# Patient Record
Sex: FEMALE | Race: WHITE | Employment: OTHER | URBAN - METROPOLITAN AREA
[De-identification: names, ages, dates, MRNs, and addresses within clinical notes are randomized per-mention and may not be internally consistent; named-entity substitution may affect disease eponyms.]

---

## 2017-06-27 ENCOUNTER — HOSPITAL ENCOUNTER (OUTPATIENT)
Dept: PREADMISSION TESTING | Age: 72
Discharge: HOME | End: 2017-06-27
Attending: ORTHOPAEDIC SURGERY | Admitting: ORTHOPAEDIC SURGERY

## 2017-06-27 VITALS
HEIGHT: 60 IN | OXYGEN SATURATION: 100 % | TEMPERATURE: 97 F | SYSTOLIC BLOOD PRESSURE: 127 MMHG | HEART RATE: 71 BPM | DIASTOLIC BLOOD PRESSURE: 85 MMHG | WEIGHT: 136 LBS | RESPIRATION RATE: 16 BRPM | BODY MASS INDEX: 26.7 KG/M2

## 2017-06-27 LAB
A/G RATIO: 1.4 (ref 1.1–2.2)
ABO/RH: NORMAL
ALBUMIN SERPL-MCNC: 4 G/DL (ref 3.4–5)
ALP BLD-CCNC: 147 U/L (ref 40–129)
ALT SERPL-CCNC: 19 U/L (ref 10–40)
ANION GAP SERPL CALCULATED.3IONS-SCNC: 9 MMOL/L (ref 3–16)
ANTIBODY SCREEN: NORMAL
APTT: 28.9 SEC (ref 21–31.8)
AST SERPL-CCNC: 39 U/L (ref 15–37)
BASOPHILS ABSOLUTE: 0 K/UL (ref 0–0.2)
BASOPHILS RELATIVE PERCENT: 0.7 %
BILIRUB SERPL-MCNC: 0.3 MG/DL (ref 0–1)
BUN BLDV-MCNC: 15 MG/DL (ref 7–20)
CALCIUM SERPL-MCNC: 9.8 MG/DL (ref 8.3–10.6)
CHLORIDE BLD-SCNC: 104 MMOL/L (ref 99–110)
CO2: 29 MMOL/L (ref 21–32)
CREAT SERPL-MCNC: 0.7 MG/DL (ref 0.6–1.2)
EKG ATRIAL RATE: 72 BPM
EKG DIAGNOSIS: NORMAL
EKG P AXIS: 66 DEGREES
EKG P-R INTERVAL: 178 MS
EKG Q-T INTERVAL: 406 MS
EKG QRS DURATION: 86 MS
EKG QTC CALCULATION (BAZETT): 444 MS
EKG R AXIS: 76 DEGREES
EKG T AXIS: 66 DEGREES
EKG VENTRICULAR RATE: 72 BPM
EOSINOPHILS ABSOLUTE: 0.1 K/UL (ref 0–0.6)
EOSINOPHILS RELATIVE PERCENT: 1.4 %
GFR AFRICAN AMERICAN: >60
GFR NON-AFRICAN AMERICAN: >60
GLOBULIN: 2.8 G/DL
GLUCOSE BLD-MCNC: 91 MG/DL (ref 70–99)
HCT VFR BLD CALC: 37.9 % (ref 36–48)
HEMOGLOBIN: 12.2 G/DL (ref 12–16)
INR BLD: 1.05 (ref 0.85–1.15)
LYMPHOCYTES ABSOLUTE: 1.2 K/UL (ref 1–5.1)
LYMPHOCYTES RELATIVE PERCENT: 24.6 %
MCH RBC QN AUTO: 30.6 PG (ref 26–34)
MCHC RBC AUTO-ENTMCNC: 32.1 G/DL (ref 31–36)
MCV RBC AUTO: 95.3 FL (ref 80–100)
MONOCYTES ABSOLUTE: 0.4 K/UL (ref 0–1.3)
MONOCYTES RELATIVE PERCENT: 9.4 %
NEUTROPHILS ABSOLUTE: 3.1 K/UL (ref 1.7–7.7)
NEUTROPHILS RELATIVE PERCENT: 63.9 %
PDW BLD-RTO: 13.9 % (ref 12.4–15.4)
PLATELET # BLD: 185 K/UL (ref 135–450)
PMV BLD AUTO: 8.8 FL (ref 5–10.5)
POTASSIUM SERPL-SCNC: 4.4 MMOL/L (ref 3.5–5.1)
PROTHROMBIN TIME: 11.9 SEC (ref 9.6–13)
RBC # BLD: 3.98 M/UL (ref 4–5.2)
SODIUM BLD-SCNC: 142 MMOL/L (ref 136–145)
TOTAL PROTEIN: 6.8 G/DL (ref 6.4–8.2)
WBC # BLD: 4.8 K/UL (ref 4–11)

## 2017-06-27 PROCEDURE — 93010 ELECTROCARDIOGRAM REPORT: CPT | Performed by: INTERNAL MEDICINE

## 2017-06-27 RX ORDER — CITALOPRAM 40 MG/1
40 TABLET ORAL DAILY
COMMUNITY

## 2017-06-27 RX ORDER — MELOXICAM 15 MG/1
15 TABLET ORAL DAILY
Status: ON HOLD | COMMUNITY
End: 2017-07-11 | Stop reason: HOSPADM

## 2017-06-27 RX ORDER — PANTOPRAZOLE SODIUM 40 MG/1
40 TABLET, DELAYED RELEASE ORAL DAILY
COMMUNITY

## 2017-06-27 RX ORDER — ZALEPLON 10 MG/1
10 CAPSULE ORAL NIGHTLY PRN
COMMUNITY
End: 2018-06-12

## 2017-06-27 RX ORDER — GABAPENTIN 300 MG/1
300 CAPSULE ORAL 3 TIMES DAILY
Status: ON HOLD | COMMUNITY
End: 2018-06-13 | Stop reason: HOSPADM

## 2017-06-27 RX ORDER — ROPINIROLE 1 MG/1
1 TABLET, FILM COATED ORAL NIGHTLY PRN
COMMUNITY
End: 2018-06-12

## 2017-06-27 RX ORDER — LISINOPRIL 20 MG/1
40 TABLET ORAL DAILY
COMMUNITY

## 2017-06-27 RX ORDER — DULOXETIN HYDROCHLORIDE 60 MG/1
60 CAPSULE, DELAYED RELEASE ORAL DAILY
Status: ON HOLD | COMMUNITY
End: 2018-06-13 | Stop reason: HOSPADM

## 2017-06-27 ASSESSMENT — PAIN DESCRIPTION - FREQUENCY: FREQUENCY: CONTINUOUS

## 2017-06-27 ASSESSMENT — PAIN DESCRIPTION - ORIENTATION: ORIENTATION: RIGHT;LEFT

## 2017-06-27 ASSESSMENT — PAIN DESCRIPTION - DESCRIPTORS: DESCRIPTORS: CONSTANT;SHARP;ACHING

## 2017-06-27 ASSESSMENT — PAIN SCALES - GENERAL: PAINLEVEL_OUTOF10: 8

## 2017-06-27 ASSESSMENT — PAIN DESCRIPTION - LOCATION: LOCATION: KNEE

## 2017-06-27 ASSESSMENT — PAIN DESCRIPTION - PAIN TYPE: TYPE: CHRONIC PAIN

## 2017-06-28 LAB — MRSA SCREEN RT-PCR: NORMAL

## 2017-07-12 PROBLEM — Z96.659 LOOSENING OF KNEE JOINT PROSTHESIS (HCC): Status: ACTIVE | Noted: 2017-07-12

## 2017-07-12 PROBLEM — T84.038A LOOSENING OF KNEE JOINT PROSTHESIS (HCC): Status: ACTIVE | Noted: 2017-07-12

## 2017-10-06 ENCOUNTER — HOSPITAL ENCOUNTER (OUTPATIENT)
Dept: PREADMISSION TESTING | Age: 72
Discharge: HOME | End: 2017-10-06
Admitting: ORTHOPAEDIC SURGERY

## 2017-10-06 VITALS
DIASTOLIC BLOOD PRESSURE: 85 MMHG | HEART RATE: 74 BPM | TEMPERATURE: 97 F | OXYGEN SATURATION: 100 % | SYSTOLIC BLOOD PRESSURE: 131 MMHG | RESPIRATION RATE: 16 BRPM | WEIGHT: 131 LBS | HEIGHT: 60 IN | BODY MASS INDEX: 25.72 KG/M2

## 2017-10-06 LAB
A/G RATIO: 2 (ref 1.1–2.2)
ABO/RH: NORMAL
ALBUMIN SERPL-MCNC: 4.2 G/DL (ref 3.4–5)
ALP BLD-CCNC: 158 U/L (ref 40–129)
ALT SERPL-CCNC: 18 U/L (ref 10–40)
ANION GAP SERPL CALCULATED.3IONS-SCNC: 10 MMOL/L (ref 3–16)
ANTIBODY SCREEN: NORMAL
APTT: 29.3 SEC (ref 24.1–34.9)
AST SERPL-CCNC: 29 U/L (ref 15–37)
BASOPHILS ABSOLUTE: 0 K/UL (ref 0–0.2)
BASOPHILS RELATIVE PERCENT: 0.8 %
BILIRUB SERPL-MCNC: <0.2 MG/DL (ref 0–1)
BUN BLDV-MCNC: 19 MG/DL (ref 7–20)
CALCIUM SERPL-MCNC: 9.3 MG/DL (ref 8.3–10.6)
CHLORIDE BLD-SCNC: 103 MMOL/L (ref 99–110)
CO2: 27 MMOL/L (ref 21–32)
CREAT SERPL-MCNC: 0.7 MG/DL (ref 0.6–1.2)
EOSINOPHILS ABSOLUTE: 0.1 K/UL (ref 0–0.6)
EOSINOPHILS RELATIVE PERCENT: 1.4 %
GFR AFRICAN AMERICAN: >60
GFR NON-AFRICAN AMERICAN: >60
GLOBULIN: 2.1 G/DL
GLUCOSE BLD-MCNC: 90 MG/DL (ref 70–99)
HCT VFR BLD CALC: 36.7 % (ref 36–48)
HEMOGLOBIN: 12 G/DL (ref 12–16)
INR BLD: 0.96 (ref 0.85–1.15)
LYMPHOCYTES ABSOLUTE: 1.3 K/UL (ref 1–5.1)
LYMPHOCYTES RELATIVE PERCENT: 25.3 %
MCH RBC QN AUTO: 30.5 PG (ref 26–34)
MCHC RBC AUTO-ENTMCNC: 32.7 G/DL (ref 31–36)
MCV RBC AUTO: 93.2 FL (ref 80–100)
MONOCYTES ABSOLUTE: 0.5 K/UL (ref 0–1.3)
MONOCYTES RELATIVE PERCENT: 8.9 %
NEUTROPHILS ABSOLUTE: 3.3 K/UL (ref 1.7–7.7)
NEUTROPHILS RELATIVE PERCENT: 63.6 %
PDW BLD-RTO: 14.4 % (ref 12.4–15.4)
PLATELET # BLD: 188 K/UL (ref 135–450)
PMV BLD AUTO: 8.6 FL (ref 5–10.5)
POTASSIUM SERPL-SCNC: 4.7 MMOL/L (ref 3.5–5.1)
PROTHROMBIN TIME: 10.9 SEC (ref 9.6–13)
RBC # BLD: 3.94 M/UL (ref 4–5.2)
SODIUM BLD-SCNC: 140 MMOL/L (ref 136–145)
TOTAL PROTEIN: 6.3 G/DL (ref 6.4–8.2)
WBC # BLD: 5.2 K/UL (ref 4–11)

## 2017-10-06 RX ORDER — HYDROCODONE BITARTRATE AND ACETAMINOPHEN 5; 325 MG/1; MG/1
1 TABLET ORAL EVERY 6 HOURS PRN
Status: ON HOLD | COMMUNITY
End: 2017-10-24 | Stop reason: HOSPADM

## 2017-10-06 ASSESSMENT — PAIN DESCRIPTION - LOCATION: LOCATION: KNEE

## 2017-10-06 ASSESSMENT — PAIN DESCRIPTION - PAIN TYPE: TYPE: CHRONIC PAIN

## 2017-10-06 ASSESSMENT — PAIN DESCRIPTION - DESCRIPTORS: DESCRIPTORS: ACHING

## 2017-10-06 ASSESSMENT — PAIN DESCRIPTION - ORIENTATION: ORIENTATION: RIGHT

## 2017-10-06 ASSESSMENT — PAIN SCALES - GENERAL: PAINLEVEL_OUTOF10: 8

## 2017-10-06 NOTE — PROGRESS NOTES
Snoring? Do you snore loudly (loud enough to be heard through closed doors, or your bed partner elbows you for snoring at night)? No    Tired? Do you often feel tired, fatigued, or sleepy during the daytime (such as falling asleep during driving)? No    Observed? Has anyone observed you stop breathing or choking/gasping during your sleep? No    Pressure? Do you have or are being treated for high blood pressure? Yes    Neck Size? (measured around Issacs apple)  For male, is your shirt collar 17 inches or larger? For female, is your shirt collar 16 inches or larger? No    Age older than 48years old? Yes    Gender = Male  Yes    Body Mass Index more than 35 kg/m2?   No    Risk of DEEDEE Scoring criteria:    [x] Low risk:  Yes to 0  2 questions    [] Intermediate risk:  Yes to 3  4 questions    [] High risk:  Yes to 5  8 questions

## 2017-10-06 NOTE — PROGRESS NOTES
The following educational items and goals will be achieved upon completion of the patient's Pre-admission testing experience:             Identify the learner who is being assessed for education:  patient                    Ability to Learn:  Exhibits ability to grasp concepts and respond to questions: High  Ready to Learn: Yes  calm   Preferred Method of Learning:  verbal  Barriers to Learning: Verbalizes interest  Special Considerations due to cultural, Denominational, spiritual beliefs:  No  Language:  English  :  Amanda Kraus  [x] Appropriate evaluation / integration of data as delineated by ASPAN Standards of Perianesthesia Nursing Practice    Pain scale and pain management   [x]Patient verbalizes understanding of pain scale and pain management  [x]Pre-operative determination of patients anticipated Post-Operative pain goal:   4 of 10 on 10 point scale post op goal  [] Other     Medication(s) - Compliance with preop medication instructions  [x] Patient verbalizes understanding of preop medications (see [facility] Presurgical Instructions)    Instructions, Pre op                                                                                            [x] Patient verbalizes understanding of presurgical instructions as reviewed with phone interview nurse or in-person nurse review    Fall Risk Potential, Preoperatively                                                                                   []No preoperative risk identified  [x]Preop risk identified:                    []Sensory deficit        []Motor deficit        []Balance problem        []Home medication        []Uses assistive device                    [x]History of a Fall within the last 30 days    Goal(s) for fall prevention:  [x]Prevent fall or injury by requesting assistance with activities of daily living  [x]Patient / Significant other verbalizes understanding the need to call for

## 2017-10-06 NOTE — PROGRESS NOTES
[address]                          Date of Procedure 10-24 Time of Procedure 0845    PRIOR TO PROCEDURE DATE:  1. Please follow any guidelines/instructions prior to your procedure as advised by your surgeon. 2. Arrange for someone to drive you home and be with you for the first 24 hours after discharge for your safety after your procedure for which you received sedation. Ensure it is someone we can share information with regarding your discharge. 3. You must contact your surgeon for instructions IF:   You are taking any blood thinners, aspirin, anti-inflammatory or vitamin E.   There is a change in your physical condition such as a cold, fever, rash, cuts, sores or any other infection, especially near your surgical site. 4. Do not drink alcohol the day before or day of your procedure. 5. A Pre-op History and Physical for surgery MUST be completed by your Physician or Urgent Care within 30 days of your procedure date. Please bring a copy with you on the day of your procedure and along with any other testing performed. THE DAY OF YOUR PROCEDURE:  1. Follow instructions for ARRIVAL TIME as DIRECTED BY YOUR SURGEON. If your surgeon does not give you a specific arrival time, please arrive at per Dr. Levi Verdugo. 2. Enter the MAIN entrance from [address] [place] [address] and follow the signs to the free [employer] or [employer] parking (offered free of charge 6am-5pm). 3. Enter the Main Entrance of the hospital (do NOT enter from the lower level of the parking garage). Upon entrance, check in with the  at the main desk on your left. If no one is available at the desk, proceed into the [facility] Waiting Room and go through the door directly into the [facility]. There is a Check-in desk ACROSS from Room 5 (marked with a sign hanging from the ceiling).  The phone number for the surgery center is [phone].    4. DO NOT EAT OR DRINK ANYTHING AFTER MIDNIGHT (exception would be medication instructions below only)     5. MEDICATIONS    Take the following medications with a SMALL sip of water: Protonix   Use your usual dose of inhalers the morning of surgery. BRING your rescue inhaler with you to hospital.    Anesthesia does NOT want you to take insulin the morning of surgery. They will control your blood sugar while you are at the hospital. Please contact your ordering physician for instructions regarding your insulin the night before your procedure. If you have an insulin pump, please keep it set on basal rate. 6. Do not swallow water when brushing teeth. No gum, candy, mints or ice chips. Refrain from smoking or at least decrease the amount. 7. Dress in loose, comfortable clothing appropriate for redressing after your procedure. Do not wear jewelry (including body piercings), make-up (especially NO eye make-up), fingernail polish (NO toenail polish if foot/leg surgery), lotion, powders or metal hairclips. 8. Dentures, glasses, or contacts will need to be removed before your procedure. Bring cases for your glasses, contacts, dentures, or hearing aids to protect them while you are in surgery. 9. If you use a CPAP, please bring it with you on the day of your procedure. 10. We recommend that valuable personal  belongings, such as credit cards, cash, cell phones, e-tablets or jewelry, be left at home during your stay. The hospital will not be responsible for valuables that are not secured in the hospital safe. However, if your insurance requires a co-pay, you may want to bring a method of payment, i.e. check, cash, or credit card, if you wish to pay your co-pay the day of surgery. 11. If you are to stay overnight, you may bring a bag with personal items. Please have any large items you may need brought in by your family after your arrival to your hospital room.     12. If you have a Living Will or Durable Power of , please bring a Should any of these symptoms become severe, or should you notice any signs of infection, you should call your surgeon. 5. Narcotic pain medications can cause significant constipation. You may want to add a stool softener to your postoperative medication schedule or speak to your surgeon on how best to manage this SIDE EFFECT. SPECIAL INSTRUCTIONS     Thank you for allowing us to care for you. We strive to exceed your expectations in the overall delivery of care and service provided to you and your family. If you need to contact us for any reason, please call us at [phone]    Instructions reviewed and copy given to patient during preadmission testing visit. Cecilia Kumar. 10/6/2017 .11:28 AM      ADDITIONAL EDUCATIONAL INFORMATION REVIEWED / PROVIDED TO YOU AND YOUR FAMILY:  Yes Taking Control of Your Pain   Yes FAQs about Surgical Site Infections  No Cardiac Surgery Instructions for AM admission to the hospital  No Learning About Preventing Pressure Sores  No Cardiac Surgery Preoperative Hibiclens® Bathing Instructions  No Your Care after Heart Surgery Binder  No Your Guide to Hip Replacement Surgery. PLEASE BRING THIS BOOKLET BACK ON THE DAY OF YOUR SURGERY. Yes Your Guide to Knee Replacement Surgery. PLEASE BRING THIS BOOKLET BACK ON THE DAY OF YOUR SURGERY. No Your Guide to Shoulder Replacement Surgery. PLEASE BRING THIS BOOKLET BACK ON THE DAY OF YOUR SURGERY.     Yes Hibiclens® Bathing Instructions   Yes Incentive Spirometer given to patient- PLEASE BRING THIS SPIROMETER BACK WITH YOU ON THE DAY OF YOUR SURGERY  No CMS Comprehensive Care for Joint Replacement Model Notification Letter  No Other

## 2017-10-07 LAB — MRSA SCREEN RT-PCR: NORMAL

## 2017-10-25 PROBLEM — T84.032A MECHANICAL LOOSENING OF INTERNAL RIGHT KNEE PROSTHETIC JOINT (HCC): Status: ACTIVE | Noted: 2017-10-25

## 2018-06-12 PROBLEM — I63.9 STROKE DETERMINED BY CLINICAL ASSESSMENT (HCC): Status: ACTIVE | Noted: 2018-06-12

## 2018-06-13 PROBLEM — G45.9 TIA (TRANSIENT ISCHEMIC ATTACK): Status: ACTIVE | Noted: 2018-06-13

## 2018-12-29 ENCOUNTER — HOSPITAL ENCOUNTER (EMERGENCY)
Age: 73
Discharge: HOME | End: 2018-12-29
Attending: EMERGENCY MEDICINE
Payer: MEDICARE

## 2018-12-29 ENCOUNTER — APPOINTMENT (OUTPATIENT)
Dept: GENERAL RADIOLOGY | Age: 73
End: 2018-12-29
Payer: MEDICARE

## 2018-12-29 ENCOUNTER — APPOINTMENT (OUTPATIENT)
Dept: CT IMAGING | Age: 73
End: 2018-12-29
Payer: MEDICARE

## 2018-12-29 VITALS
SYSTOLIC BLOOD PRESSURE: 128 MMHG | WEIGHT: 139.55 LBS | BODY MASS INDEX: 28.13 KG/M2 | TEMPERATURE: 98.1 F | DIASTOLIC BLOOD PRESSURE: 74 MMHG | RESPIRATION RATE: 18 BRPM | HEIGHT: 59 IN | HEART RATE: 85 BPM | OXYGEN SATURATION: 98 %

## 2018-12-29 DIAGNOSIS — J18.9 PNEUMONIA DUE TO ORGANISM: Primary | ICD-10-CM

## 2018-12-29 LAB
ANION GAP SERPL CALCULATED.3IONS-SCNC: 9 MMOL/L (ref 3–16)
BASOPHILS ABSOLUTE: 0 K/UL (ref 0–0.2)
BASOPHILS RELATIVE PERCENT: 0.5 %
BUN BLDV-MCNC: 17 MG/DL (ref 7–20)
CALCIUM SERPL-MCNC: 9.2 MG/DL (ref 8.3–10.6)
CHLORIDE BLD-SCNC: 105 MMOL/L (ref 99–110)
CO2: 25 MMOL/L (ref 21–32)
CREAT SERPL-MCNC: <0.5 MG/DL (ref 0.6–1.2)
D DIMER: 979 NG/ML DDU (ref 0–229)
EOSINOPHILS ABSOLUTE: 0.1 K/UL (ref 0–0.6)
EOSINOPHILS RELATIVE PERCENT: 1.6 %
GFR AFRICAN AMERICAN: >60
GFR NON-AFRICAN AMERICAN: >60
GLUCOSE BLD-MCNC: 102 MG/DL (ref 70–99)
HCT VFR BLD CALC: 38.6 % (ref 36–48)
HEMOGLOBIN: 12.2 G/DL (ref 12–16)
LYMPHOCYTES ABSOLUTE: 1.5 K/UL (ref 1–5.1)
LYMPHOCYTES RELATIVE PERCENT: 15.9 %
MCH RBC QN AUTO: 31.8 PG (ref 26–34)
MCHC RBC AUTO-ENTMCNC: 31.6 G/DL (ref 31–36)
MCV RBC AUTO: 100.6 FL (ref 80–100)
MONOCYTES ABSOLUTE: 1 K/UL (ref 0–1.3)
MONOCYTES RELATIVE PERCENT: 10.9 %
NEUTROPHILS ABSOLUTE: 6.7 K/UL (ref 1.7–7.7)
NEUTROPHILS RELATIVE PERCENT: 71.1 %
PDW BLD-RTO: 13.8 % (ref 12.4–15.4)
PLATELET # BLD: 208 K/UL (ref 135–450)
PMV BLD AUTO: 8.9 FL (ref 5–10.5)
POTASSIUM REFLEX MAGNESIUM: 4.8 MMOL/L (ref 3.5–5.1)
RBC # BLD: 3.83 M/UL (ref 4–5.2)
SODIUM BLD-SCNC: 139 MMOL/L (ref 136–145)
TROPONIN: <0.01 NG/ML
TROPONIN: <0.01 NG/ML
WBC # BLD: 9.4 K/UL (ref 4–11)

## 2018-12-29 PROCEDURE — 84484 ASSAY OF TROPONIN QUANT: CPT

## 2018-12-29 PROCEDURE — 96374 THER/PROPH/DIAG INJ IV PUSH: CPT

## 2018-12-29 PROCEDURE — 99285 EMERGENCY DEPT VISIT HI MDM: CPT

## 2018-12-29 PROCEDURE — 71275 CT ANGIOGRAPHY CHEST: CPT

## 2018-12-29 PROCEDURE — 6360000004 HC RX CONTRAST MEDICATION: Performed by: EMERGENCY MEDICINE

## 2018-12-29 PROCEDURE — 80048 BASIC METABOLIC PNL TOTAL CA: CPT

## 2018-12-29 PROCEDURE — 6360000002 HC RX W HCPCS: Performed by: PHYSICIAN ASSISTANT

## 2018-12-29 PROCEDURE — 85025 COMPLETE CBC W/AUTO DIFF WBC: CPT

## 2018-12-29 PROCEDURE — 93005 ELECTROCARDIOGRAM TRACING: CPT | Performed by: PHYSICIAN ASSISTANT

## 2018-12-29 PROCEDURE — 6370000000 HC RX 637 (ALT 250 FOR IP): Performed by: PHYSICIAN ASSISTANT

## 2018-12-29 PROCEDURE — 36415 COLL VENOUS BLD VENIPUNCTURE: CPT

## 2018-12-29 PROCEDURE — 85379 FIBRIN DEGRADATION QUANT: CPT

## 2018-12-29 PROCEDURE — 71046 X-RAY EXAM CHEST 2 VIEWS: CPT

## 2018-12-29 RX ORDER — MORPHINE SULFATE 4 MG/ML
4 INJECTION, SOLUTION INTRAMUSCULAR; INTRAVENOUS ONCE
Status: COMPLETED | OUTPATIENT
Start: 2018-12-29 | End: 2018-12-29

## 2018-12-29 RX ORDER — NITROGLYCERIN 0.4 MG/1
0.4 TABLET SUBLINGUAL EVERY 5 MIN PRN
Status: DISCONTINUED | OUTPATIENT
Start: 2018-12-29 | End: 2018-12-29 | Stop reason: HOSPADM

## 2018-12-29 RX ORDER — AZITHROMYCIN 250 MG/1
250 TABLET, FILM COATED ORAL SEE ADMIN INSTRUCTIONS
Qty: 6 TABLET | Refills: 0 | Status: SHIPPED | OUTPATIENT
Start: 2018-12-29 | End: 2019-01-03

## 2018-12-29 RX ORDER — ASPIRIN 81 MG/1
324 TABLET, CHEWABLE ORAL ONCE
Status: COMPLETED | OUTPATIENT
Start: 2018-12-29 | End: 2018-12-29

## 2018-12-29 RX ORDER — AMOXICILLIN 500 MG/1
1000 CAPSULE ORAL 3 TIMES DAILY
Qty: 30 CAPSULE | Refills: 0 | Status: SHIPPED | OUTPATIENT
Start: 2018-12-29 | End: 2019-01-03

## 2018-12-29 RX ORDER — IBUPROFEN 400 MG/1
400 TABLET ORAL ONCE
Status: COMPLETED | OUTPATIENT
Start: 2018-12-29 | End: 2018-12-29

## 2018-12-29 RX ADMIN — IBUPROFEN 400 MG: 400 TABLET, FILM COATED ORAL at 13:03

## 2018-12-29 RX ADMIN — NITROGLYCERIN 0.4 MG: 0.4 TABLET, ORALLY DISINTEGRATING SUBLINGUAL at 09:22

## 2018-12-29 RX ADMIN — MORPHINE SULFATE 4 MG: 4 INJECTION, SOLUTION INTRAMUSCULAR; INTRAVENOUS at 09:46

## 2018-12-29 RX ADMIN — IOPAMIDOL 80 ML: 755 INJECTION, SOLUTION INTRAVENOUS at 11:31

## 2018-12-29 RX ADMIN — ASPIRIN 81 MG 324 MG: 81 TABLET ORAL at 09:22

## 2018-12-29 ASSESSMENT — PAIN SCALES - GENERAL
PAINLEVEL_OUTOF10: 7
PAINLEVEL_OUTOF10: 5
PAINLEVEL_OUTOF10: 5
PAINLEVEL_OUTOF10: 7

## 2018-12-29 ASSESSMENT — PAIN DESCRIPTION - ORIENTATION: ORIENTATION: LEFT;UPPER

## 2018-12-29 ASSESSMENT — PAIN DESCRIPTION - DESCRIPTORS: DESCRIPTORS: STABBING

## 2018-12-30 LAB
EKG ATRIAL RATE: 74 BPM
EKG DIAGNOSIS: NORMAL
EKG P AXIS: 42 DEGREES
EKG P-R INTERVAL: 174 MS
EKG Q-T INTERVAL: 396 MS
EKG QRS DURATION: 84 MS
EKG QTC CALCULATION (BAZETT): 439 MS
EKG R AXIS: 59 DEGREES
EKG T AXIS: 65 DEGREES
EKG VENTRICULAR RATE: 74 BPM

## 2020-06-24 ENCOUNTER — HOSPITAL ENCOUNTER (OUTPATIENT)
Dept: NUCLEAR MEDICINE | Age: 75
Discharge: HOME | End: 2020-06-24
Payer: MEDICARE

## 2020-06-24 PROCEDURE — 78315 BONE IMAGING 3 PHASE: CPT

## 2020-06-24 PROCEDURE — 3430000000 HC RX DIAGNOSTIC RADIOPHARMACEUTICAL: Performed by: ORTHOPAEDIC SURGERY

## 2020-06-24 PROCEDURE — A9503 TC99M MEDRONATE: HCPCS | Performed by: ORTHOPAEDIC SURGERY

## 2020-06-24 RX ORDER — TC 99M MEDRONATE 20 MG/10ML
24.8 INJECTION, POWDER, LYOPHILIZED, FOR SOLUTION INTRAVENOUS
Status: COMPLETED | OUTPATIENT
Start: 2020-06-24 | End: 2020-06-24

## 2020-06-24 RX ADMIN — TC 99M MEDRONATE 24.8 MILLICURIE: 20 INJECTION, POWDER, LYOPHILIZED, FOR SOLUTION INTRAVENOUS at 12:43

## 2020-06-25 LAB
C-REACTIVE PROTEIN: 0.4 MG/L (ref 0–5.1)
SEDIMENTATION RATE, ERYTHROCYTE: 6 MM/HR (ref 0–30)

## 2020-09-18 NOTE — PROGRESS NOTES
[facility] / [facility] ([facility]) [address], [address] [place] [address]    Acknowledgment of Informed Consent for Surgical or Medical Procedure and Sedation  I agree to allow doctor(s) Sharonda Paniagua and his/her associates or assistants, including residents and/or other qualified medical practitioner to perform the following medical treatment or procedure and to administer or direct the administration of sedation as necessary:  Procedure(s): RIGHT TOTAL KNEE ARTHROPLASTY REVISION  My doctor has explained the following regarding the proposed procedure:   the explanation of the procedure   the benefits of the procedure   the potential problems that might occur during recuperation   the risks and side effects of the procedure which could include but are not limited to severe blood loss, infection, stroke or death   the benefits, risks and side effect of alternative procedures including the consequences of declining this procedure or any alternative procedures   the likelihood of achieving satisfactory results. I acknowledge no guarantee or assurance has been made to me regarding the results. I understand that during the course of this treatment/procedure, unforeseen conditions can occur which require an additional or different procedure. I agree to allow my physician or assistants to perform such extension of the original procedure as they may find necessary. I understand that sedation will often result in temporary impairment of memory and fine motor skills and that sedation can occasionally progress to a state of deep sedation or general anesthesia. I understand the risks of anesthesia for surgery include, but are not limited to, sore throat, hoarseness, injury to face, mouth, or teeth; nausea; headache; injury to blood vessels or nerves; death, brain damage, or paralysis.     I understand that if I have a Limitation of Treatment order in effect during my hospitalization, the order may or may not be in effect during this procedure. I give my doctor permission to give me blood or blood products. I understand that there are risks with receiving blood such as hepatitis, AIDS, fever, or allergic reaction. I acknowledge that the risks, benefits, and alternatives of this treatment have been explained to me and that no express or implied warranty has been given by the hospital, any blood bank, or any person or entity as to the blood or blood components transfused. At the discretion of my doctor, I agree to allow observers, equipment/product representatives and allow photographing, and/or televising of the procedure, provided my name or identity is maintained confidentially. I agree the hospital may dispose of or use for scientific or educational purposes any tissue, fluid, or body parts which may be removed.     ________________________________Date________Time______ am/pm  ([facility] [facility])  Patient or Signature of Closest Relative or Legal Guardian    ________________________________Date________Time______am/pm      Page 1 of  1  Witness

## 2020-09-23 ENCOUNTER — NURSE ONLY (OUTPATIENT)
Dept: PRIMARY CARE CLINIC | Age: 75
End: 2020-09-23
Payer: MEDICARE

## 2020-09-23 PROCEDURE — 99211 OFF/OP EST MAY X REQ PHY/QHP: CPT | Performed by: NURSE PRACTITIONER

## 2020-09-25 LAB — SARS-COV-2, NAA: NOT DETECTED

## 2020-09-25 NOTE — PROGRESS NOTES
Spoke with Ayah Sotelo at Dr. Jv Brewster office requesting EKG tracing that was done their today and requested it to be faxed to us of which she verbalizes understanding.

## 2020-09-25 NOTE — PROGRESS NOTES
[address] [facility]                          Date of Procedure 9/29/220     PRIOR TO PROCEDURE DATE:  1. Please follow any guidelines/instructions prior to your procedure as advised by your surgeon. 2. Arrange for someone to drive you home and be with you for the first 24 hours after discharge for your safety after your procedure for which you received sedation. Ensure it is someone we can share information with regarding your discharge. 3. You must contact your surgeon for instructions IF:   You are taking any blood thinners, aspirin, anti-inflammatory or vitamin E.   There is a change in your physical condition such as a cold, fever, rash, cuts, sores or any other infection, especially near your surgical site. 4. Do not drink alcohol the day before or day of your procedure. 5. A Pre-op History and Physical for surgery MUST be completed by your Physician or Urgent Care within 30 days of your procedure date. Please bring a copy with you on the day of your procedure and along with any other testing performed. THE DAY OF YOUR PROCEDURE:  1. Follow instructions for ARRIVAL TIME as DIRECTED BY YOUR SURGEON. 2. Enter the MAIN entrance from [email or web address] and follow the signs to the free [employer] or [email or web address] parking (offered free of charge 6am-5pm). 3. Enter the Main Entrance of the hospital (do NOT enter from the lower level of the parking garage). Upon entrance, check in with the  at the main desk on your left. If no one is available at the desk, proceed into the [facility] Waiting Room and go through the door directly into the [facility]. There is a Check-in desk ACROSS from Room 5 (marked with a sign hanging from the ceiling). The phone number for the surgery center is [phone].    4. DO NOT EAT ANYTHING eight hours prior to surgery.   May have 8 ounces of water 4 hours prior to surgery (exception would be medication With your permission, one family member may accompany you while you are being prepared for surgery. Once you are ready, additional family members may join you. HOW WE KEEP YOU SAFE and WORK TO PREVENT SURGICAL SITE INFECTIONS:  1. Health care workers should always check your ID bracelet to verify your name and birth date. You will be asked many times to state your name, date of birth, and allergies. 2. Health care workers should always clean their hands with soap or alcohol gel before providing care to you. It is okay to ask anyone if they cleaned their hands before they touch you. 3. You will be actively involved in verifying the type of procedure you are having and ensuring the correct surgical site. This will be confirmed multiple times prior to your procedure. Do NOT phyllis your surgery site UNLESS instructed to by your surgeon. 4. Do not shave or wax for 72 hours prior to procedure near your operative site. Shaving with a razor can irritate your skin and make it easier to develop an infection. On the day of your procedure, any hair that needs to be removed near the surgical site will be clipped by a healthcare worker using a special clippers designed to avoid skin irritation. 5. When you are in the operating room, your surgical site will be cleansed with a special soap, and in most cases, you will be given an antibiotic before the surgery begins. What to expect AFTER YOUR PROCEDURE:  1. Immediately following your procedure, your will be taken to the PACU for the first phase of your recovery. Your nurse will help you recover from any potential side effects of anesthesia, such as extreme drowsiness, changes in your vital signs or breathing patterns. Nausea, headache, muscle aches, or sore throat may also occur after anesthesia. Your nurse will help you manage these potential side effects.     2. For comfort and safety, arrange to have someone at home with you for the first 24 hours after discharge. 3. You and your family will be given written instructions about your diet, activity, dressing care, medications, and return visits. 4. Once at home, should issues with nausea, pain, or bleeding occur, or should you notice any signs of infection, you should call your surgeon. 5. Always clean your hands before and after caring for your wound. Do not let your family touch your surgery site without cleaning their hands. 6. Narcotic pain medications can cause significant constipation. You may want to add a stool softener to your postoperative medication schedule or speak to your surgeon on how best to manage this SIDE EFFECT. SPECIAL INSTRUCTIONS watch for joint video to be emailed to you. 1 VISITOR ; CAN WAIT IN WAITING ROOM, ONCE IN PATIENT ROOM MAY GO UP TO SEE; VISITNG HOURS 9AM TO 7PM ONLY 1 VISITOR A DAY CAN NOT SWITCH OUT. Thank you for allowing us to care for you. We strive to exceed your expectations in the overall delivery of care and service provided to you and your family. If you need to contact us for any reason, please call us at [phone]. Instructions reviewed and copy given to patient during preadmission testing visit. Marielle Lam. 9/25/2020 .3:16 PM      ADDITIONAL EDUCATIONAL INFORMATION REVIEWED / PROVIDED TO YOU AND YOUR FAMILY:  Yes Taking Control of Your Pain   Yes FAQs about Surgical Site Infections    No Cardiac Surgery Instructions for AM admission to the hospital  No Bactroban® Nasal Ointment Instructions for Cardiac Surgery  No Learning About Preventing Pressure Sores  No Cardiac Surgery Preoperative Hibiclens® Bathing Instructions  No Your Care after Heart Surgery Binder    Yes Hibiclens® Bathing Instructions  No Antibacterial Soap

## 2020-09-25 NOTE — PROGRESS NOTES
Left message with patient to touch base about history and physical she is having done today and necessary lab work. I left our phone number and said I will try back later today if we do not hear from her.

## 2020-09-26 LAB — CULTURE NOSE: NORMAL

## 2020-09-28 ENCOUNTER — ANESTHESIA EVENT (OUTPATIENT)
Dept: OPERATING ROOM | Age: 75
DRG: 467 | End: 2020-09-28
Payer: MEDICARE

## 2020-09-29 ENCOUNTER — ANESTHESIA (OUTPATIENT)
Dept: OPERATING ROOM | Age: 75
DRG: 467 | End: 2020-09-29
Payer: MEDICARE

## 2020-09-29 ENCOUNTER — APPOINTMENT (OUTPATIENT)
Dept: GENERAL RADIOLOGY | Age: 75
DRG: 467 | End: 2020-09-29
Attending: ORTHOPAEDIC SURGERY
Payer: MEDICARE

## 2020-09-29 ENCOUNTER — HOSPITAL ENCOUNTER (INPATIENT)
Age: 75
LOS: 1 days | Discharge: HOME | DRG: 467 | End: 2020-10-01
Attending: ORTHOPAEDIC SURGERY | Admitting: ORTHOPAEDIC SURGERY
Payer: MEDICARE

## 2020-09-29 VITALS
SYSTOLIC BLOOD PRESSURE: 119 MMHG | TEMPERATURE: 90.5 F | RESPIRATION RATE: 12 BRPM | OXYGEN SATURATION: 97 % | DIASTOLIC BLOOD PRESSURE: 74 MMHG

## 2020-09-29 LAB
A/G RATIO: 1.8 (ref 1.1–2.2)
ABO/RH: NORMAL
ALBUMIN SERPL-MCNC: 4.1 G/DL (ref 3.4–5)
ALP BLD-CCNC: 138 U/L (ref 40–129)
ALT SERPL-CCNC: 13 U/L (ref 10–40)
ANION GAP SERPL CALCULATED.3IONS-SCNC: 9 MMOL/L (ref 3–16)
ANTIBODY SCREEN: NORMAL
APPEARANCE FLUID: CLEAR
APTT: 25.7 SEC (ref 24.2–36.2)
AST SERPL-CCNC: 24 U/L (ref 15–37)
BASOPHILS ABSOLUTE: 0 K/UL (ref 0–0.2)
BASOPHILS RELATIVE PERCENT: 1 %
BILIRUB SERPL-MCNC: 0.4 MG/DL (ref 0–1)
BUN BLDV-MCNC: 17 MG/DL (ref 7–20)
C-REACTIVE PROTEIN: <0.3 MG/L (ref 0–5.1)
CALCIUM SERPL-MCNC: 9.1 MG/DL (ref 8.3–10.6)
CELL COUNT FLUID TYPE: NORMAL
CHLORIDE BLD-SCNC: 105 MMOL/L (ref 99–110)
CLOT EVALUATION: NORMAL
CO2: 26 MMOL/L (ref 21–32)
COLOR FLUID: YELLOW
CREAT SERPL-MCNC: 0.7 MG/DL (ref 0.6–1.2)
EOSINOPHILS ABSOLUTE: 0.1 K/UL (ref 0–0.6)
EOSINOPHILS RELATIVE PERCENT: 1.6 %
FLUID DIFF COMMENT: NORMAL
GFR AFRICAN AMERICAN: >60
GFR NON-AFRICAN AMERICAN: >60
GLOBULIN: 2.3 G/DL
GLUCOSE BLD-MCNC: 95 MG/DL (ref 70–99)
HCT VFR BLD CALC: 36.5 % (ref 36–48)
HEMOGLOBIN: 12 G/DL (ref 12–16)
LYMPHOCYTES ABSOLUTE: 1.2 K/UL (ref 1–5.1)
LYMPHOCYTES RELATIVE PERCENT: 26.5 %
MCH RBC QN AUTO: 31.9 PG (ref 26–34)
MCHC RBC AUTO-ENTMCNC: 32.9 G/DL (ref 31–36)
MCV RBC AUTO: 96.8 FL (ref 80–100)
MONOCYTES ABSOLUTE: 0.4 K/UL (ref 0–1.3)
MONOCYTES RELATIVE PERCENT: 9.4 %
NEUTROPHILS ABSOLUTE: 2.7 K/UL (ref 1.7–7.7)
NEUTROPHILS RELATIVE PERCENT: 61.5 %
NUCLEATED CELLS FLUID: 10 /CUMM
PDW BLD-RTO: 13.4 % (ref 12.4–15.4)
PLATELET # BLD: 157 K/UL (ref 135–450)
PMV BLD AUTO: 9 FL (ref 5–10.5)
POTASSIUM SERPL-SCNC: 4.7 MMOL/L (ref 3.5–5.1)
RBC # BLD: 3.77 M/UL (ref 4–5.2)
RBC FLUID: 1450 /CUMM
SEDIMENTATION RATE, ERYTHROCYTE: 9 MM/HR (ref 0–30)
SODIUM BLD-SCNC: 140 MMOL/L (ref 136–145)
TOTAL PROTEIN: 6.4 G/DL (ref 6.4–8.2)
WBC # BLD: 4.4 K/UL (ref 4–11)

## 2020-09-29 PROCEDURE — 6360000002 HC RX W HCPCS: Performed by: ORTHOPAEDIC SURGERY

## 2020-09-29 PROCEDURE — C1776 JOINT DEVICE (IMPLANTABLE): HCPCS | Performed by: ORTHOPAEDIC SURGERY

## 2020-09-29 PROCEDURE — 3700000000 HC ANESTHESIA ATTENDED CARE: Performed by: ORTHOPAEDIC SURGERY

## 2020-09-29 PROCEDURE — 2709999900 HC NON-CHARGEABLE SUPPLY: Performed by: ORTHOPAEDIC SURGERY

## 2020-09-29 PROCEDURE — 7100000000 HC PACU RECOVERY - FIRST 15 MIN: Performed by: ORTHOPAEDIC SURGERY

## 2020-09-29 PROCEDURE — 85025 COMPLETE CBC W/AUTO DIFF WBC: CPT

## 2020-09-29 PROCEDURE — 2500000003 HC RX 250 WO HCPCS: Performed by: NURSE ANESTHETIST, CERTIFIED REGISTERED

## 2020-09-29 PROCEDURE — 80053 COMPREHEN METABOLIC PANEL: CPT

## 2020-09-29 PROCEDURE — 6370000000 HC RX 637 (ALT 250 FOR IP): Performed by: ORTHOPAEDIC SURGERY

## 2020-09-29 PROCEDURE — 2580000003 HC RX 258: Performed by: ORTHOPAEDIC SURGERY

## 2020-09-29 PROCEDURE — 6360000002 HC RX W HCPCS: Performed by: ANESTHESIOLOGY

## 2020-09-29 PROCEDURE — 2720000010 HC SURG SUPPLY STERILE: Performed by: ORTHOPAEDIC SURGERY

## 2020-09-29 PROCEDURE — G0378 HOSPITAL OBSERVATION PER HR: HCPCS

## 2020-09-29 PROCEDURE — 2580000003 HC RX 258: Performed by: ANESTHESIOLOGY

## 2020-09-29 PROCEDURE — 87205 SMEAR GRAM STAIN: CPT

## 2020-09-29 PROCEDURE — 62327 NJX INTERLAMINAR LMBR/SAC: CPT | Performed by: ANESTHESIOLOGY

## 2020-09-29 PROCEDURE — 86850 RBC ANTIBODY SCREEN: CPT

## 2020-09-29 PROCEDURE — 89050 BODY FLUID CELL COUNT: CPT

## 2020-09-29 PROCEDURE — 3700000001 HC ADD 15 MINUTES (ANESTHESIA): Performed by: ORTHOPAEDIC SURGERY

## 2020-09-29 PROCEDURE — 85652 RBC SED RATE AUTOMATED: CPT

## 2020-09-29 PROCEDURE — 0SRT0J9 REPLACEMENT OF RIGHT KNEE JOINT, FEMORAL SURFACE WITH SYNTHETIC SUBSTITUTE, CEMENTED, OPEN APPROACH: ICD-10-PCS | Performed by: ORTHOPAEDIC SURGERY

## 2020-09-29 PROCEDURE — 7100000001 HC PACU RECOVERY - ADDTL 15 MIN: Performed by: ORTHOPAEDIC SURGERY

## 2020-09-29 PROCEDURE — 86900 BLOOD TYPING SEROLOGIC ABO: CPT

## 2020-09-29 PROCEDURE — C1713 ANCHOR/SCREW BN/BN,TIS/BN: HCPCS | Performed by: ORTHOPAEDIC SURGERY

## 2020-09-29 PROCEDURE — 0SPT0JZ REMOVAL OF SYNTHETIC SUBSTITUTE FROM RIGHT KNEE JOINT, FEMORAL SURFACE, OPEN APPROACH: ICD-10-PCS | Performed by: ORTHOPAEDIC SURGERY

## 2020-09-29 PROCEDURE — 86140 C-REACTIVE PROTEIN: CPT

## 2020-09-29 PROCEDURE — 3600000015 HC SURGERY LEVEL 5 ADDTL 15MIN: Performed by: ORTHOPAEDIC SURGERY

## 2020-09-29 PROCEDURE — 73560 X-RAY EXAM OF KNEE 1 OR 2: CPT

## 2020-09-29 PROCEDURE — 94150 VITAL CAPACITY TEST: CPT

## 2020-09-29 PROCEDURE — 86901 BLOOD TYPING SEROLOGIC RH(D): CPT

## 2020-09-29 PROCEDURE — 2500000003 HC RX 250 WO HCPCS: Performed by: ORTHOPAEDIC SURGERY

## 2020-09-29 PROCEDURE — 87070 CULTURE OTHR SPECIMN AEROBIC: CPT

## 2020-09-29 PROCEDURE — 85730 THROMBOPLASTIN TIME PARTIAL: CPT

## 2020-09-29 PROCEDURE — 6360000002 HC RX W HCPCS: Performed by: NURSE ANESTHETIST, CERTIFIED REGISTERED

## 2020-09-29 PROCEDURE — 3600000005 HC SURGERY LEVEL 5 BASE: Performed by: ORTHOPAEDIC SURGERY

## 2020-09-29 DEVICE — IMPLANTABLE DEVICE: Type: IMPLANTABLE DEVICE | Site: KNEE | Status: FUNCTIONAL

## 2020-09-29 DEVICE — ADAPTER FEM +2/-2MM OFFSET BOLT PFC SIG: Type: IMPLANTABLE DEVICE | Site: KNEE | Status: FUNCTIONAL

## 2020-09-29 DEVICE — CEMENT BNE 20ML 41GM FULL DOSE PMMA W/ TOBRA M VISC RADPQ: Type: IMPLANTABLE DEVICE | Site: KNEE | Status: FUNCTIONAL

## 2020-09-29 DEVICE — COMPONENT FEM SZ 2.5 AP59MM ML63MM R KNEE CO CHROM CRUCE: Type: IMPLANTABLE DEVICE | Site: KNEE | Status: FUNCTIONAL

## 2020-09-29 DEVICE — ADAPTER FEM 5DEG KNEE PFC SIG: Type: IMPLANTABLE DEVICE | Site: KNEE | Status: FUNCTIONAL

## 2020-09-29 RX ORDER — OXYCODONE HYDROCHLORIDE AND ACETAMINOPHEN 5; 325 MG/1; MG/1
2 TABLET ORAL PRN
Status: DISCONTINUED | OUTPATIENT
Start: 2020-09-29 | End: 2020-09-29 | Stop reason: HOSPADM

## 2020-09-29 RX ORDER — LORAZEPAM 0.5 MG/1
0.5 TABLET ORAL NIGHTLY PRN
Status: DISCONTINUED | OUTPATIENT
Start: 2020-09-29 | End: 2020-10-01 | Stop reason: HOSPADM

## 2020-09-29 RX ORDER — VITAMIN B COMPLEX
1000 TABLET ORAL DAILY
Status: DISCONTINUED | OUTPATIENT
Start: 2020-09-29 | End: 2020-10-01 | Stop reason: HOSPADM

## 2020-09-29 RX ORDER — PROPOFOL 10 MG/ML
INJECTION, EMULSION INTRAVENOUS CONTINUOUS PRN
Status: DISCONTINUED | OUTPATIENT
Start: 2020-09-29 | End: 2020-09-29 | Stop reason: SDUPTHER

## 2020-09-29 RX ORDER — SODIUM CHLORIDE 0.9 % (FLUSH) 0.9 %
10 SYRINGE (ML) INJECTION EVERY 12 HOURS SCHEDULED
Status: DISCONTINUED | OUTPATIENT
Start: 2020-09-29 | End: 2020-09-29 | Stop reason: HOSPADM

## 2020-09-29 RX ORDER — ATORVASTATIN CALCIUM 20 MG/1
40 TABLET, FILM COATED ORAL NIGHTLY
Status: DISCONTINUED | OUTPATIENT
Start: 2020-09-29 | End: 2020-10-01 | Stop reason: HOSPADM

## 2020-09-29 RX ORDER — OXYCODONE HYDROCHLORIDE 15 MG/1
15 TABLET ORAL EVERY 4 HOURS PRN
Status: DISCONTINUED | OUTPATIENT
Start: 2020-09-29 | End: 2020-10-01 | Stop reason: HOSPADM

## 2020-09-29 RX ORDER — GABAPENTIN 100 MG/1
100 CAPSULE ORAL 3 TIMES DAILY
Status: DISCONTINUED | OUTPATIENT
Start: 2020-09-29 | End: 2020-10-01 | Stop reason: HOSPADM

## 2020-09-29 RX ORDER — FENTANYL CITRATE 50 UG/ML
50 INJECTION, SOLUTION INTRAMUSCULAR; INTRAVENOUS EVERY 5 MIN PRN
Status: DISCONTINUED | OUTPATIENT
Start: 2020-09-29 | End: 2020-09-29 | Stop reason: HOSPADM

## 2020-09-29 RX ORDER — LIDOCAINE HYDROCHLORIDE 20 MG/ML
INJECTION, SOLUTION INTRAVENOUS PRN
Status: DISCONTINUED | OUTPATIENT
Start: 2020-09-29 | End: 2020-09-29 | Stop reason: SDUPTHER

## 2020-09-29 RX ORDER — OXYCODONE HYDROCHLORIDE 5 MG/1
5 TABLET ORAL EVERY 6 HOURS PRN
Qty: 28 TABLET | Refills: 0 | Status: SHIPPED | OUTPATIENT
Start: 2020-09-29 | End: 2020-10-06

## 2020-09-29 RX ORDER — FENTANYL CITRATE 50 UG/ML
25 INJECTION, SOLUTION INTRAMUSCULAR; INTRAVENOUS EVERY 5 MIN PRN
Status: DISCONTINUED | OUTPATIENT
Start: 2020-09-29 | End: 2020-09-29 | Stop reason: HOSPADM

## 2020-09-29 RX ORDER — SODIUM CHLORIDE, SODIUM LACTATE, POTASSIUM CHLORIDE, CALCIUM CHLORIDE 600; 310; 30; 20 MG/100ML; MG/100ML; MG/100ML; MG/100ML
INJECTION, SOLUTION INTRAVENOUS CONTINUOUS
Status: DISCONTINUED | OUTPATIENT
Start: 2020-09-29 | End: 2020-09-29

## 2020-09-29 RX ORDER — OXYCODONE HYDROCHLORIDE AND ACETAMINOPHEN 5; 325 MG/1; MG/1
1 TABLET ORAL PRN
Status: DISCONTINUED | OUTPATIENT
Start: 2020-09-29 | End: 2020-09-29 | Stop reason: HOSPADM

## 2020-09-29 RX ORDER — SODIUM CHLORIDE 0.9 % (FLUSH) 0.9 %
10 SYRINGE (ML) INJECTION PRN
Status: DISCONTINUED | OUTPATIENT
Start: 2020-09-29 | End: 2020-09-29 | Stop reason: HOSPADM

## 2020-09-29 RX ORDER — HYDRALAZINE HYDROCHLORIDE 20 MG/ML
5 INJECTION INTRAMUSCULAR; INTRAVENOUS EVERY 10 MIN PRN
Status: DISCONTINUED | OUTPATIENT
Start: 2020-09-29 | End: 2020-09-29 | Stop reason: HOSPADM

## 2020-09-29 RX ORDER — METHOCARBAMOL 500 MG/1
500 TABLET, FILM COATED ORAL 4 TIMES DAILY
Status: DISCONTINUED | OUTPATIENT
Start: 2020-09-29 | End: 2020-10-01 | Stop reason: HOSPADM

## 2020-09-29 RX ORDER — CITALOPRAM 20 MG/1
40 TABLET ORAL DAILY
Status: DISCONTINUED | OUTPATIENT
Start: 2020-09-30 | End: 2020-10-01 | Stop reason: HOSPADM

## 2020-09-29 RX ORDER — PANTOPRAZOLE SODIUM 40 MG/1
40 TABLET, DELAYED RELEASE ORAL DAILY
Status: DISCONTINUED | OUTPATIENT
Start: 2020-09-30 | End: 2020-10-01 | Stop reason: HOSPADM

## 2020-09-29 RX ORDER — HYDROCHLOROTHIAZIDE 25 MG/1
25 TABLET ORAL DAILY
Status: DISCONTINUED | OUTPATIENT
Start: 2020-09-29 | End: 2020-09-29

## 2020-09-29 RX ORDER — CEFAZOLIN SODIUM 2 G/50ML
2 SOLUTION INTRAVENOUS EVERY 8 HOURS
Status: COMPLETED | OUTPATIENT
Start: 2020-09-29 | End: 2020-09-30

## 2020-09-29 RX ORDER — SODIUM CHLORIDE 9 MG/ML
INJECTION, SOLUTION INTRAVENOUS CONTINUOUS
Status: DISCONTINUED | OUTPATIENT
Start: 2020-09-29 | End: 2020-10-01 | Stop reason: HOSPADM

## 2020-09-29 RX ORDER — CEFAZOLIN SODIUM 2 G/50ML
2 SOLUTION INTRAVENOUS ONCE
Status: COMPLETED | OUTPATIENT
Start: 2020-09-29 | End: 2020-09-29

## 2020-09-29 RX ORDER — SULINDAC 150 MG/1
150 TABLET ORAL 2 TIMES DAILY
Qty: 60 TABLET | Refills: 3 | Status: SHIPPED | OUTPATIENT
Start: 2020-09-29

## 2020-09-29 RX ORDER — LIDOCAINE HYDROCHLORIDE 10 MG/ML
1 INJECTION, SOLUTION EPIDURAL; INFILTRATION; INTRACAUDAL; PERINEURAL
Status: DISCONTINUED | OUTPATIENT
Start: 2020-09-29 | End: 2020-09-29 | Stop reason: HOSPADM

## 2020-09-29 RX ORDER — FENTANYL CITRATE 50 UG/ML
100 INJECTION, SOLUTION INTRAMUSCULAR; INTRAVENOUS ONCE
Status: COMPLETED | OUTPATIENT
Start: 2020-09-29 | End: 2020-09-29

## 2020-09-29 RX ORDER — EPHEDRINE SULFATE 50 MG/ML
INJECTION, SOLUTION INTRAVENOUS PRN
Status: DISCONTINUED | OUTPATIENT
Start: 2020-09-29 | End: 2020-09-29 | Stop reason: SDUPTHER

## 2020-09-29 RX ORDER — ONDANSETRON 2 MG/ML
4 INJECTION INTRAMUSCULAR; INTRAVENOUS
Status: DISCONTINUED | OUTPATIENT
Start: 2020-09-29 | End: 2020-09-29 | Stop reason: HOSPADM

## 2020-09-29 RX ORDER — ONDANSETRON 2 MG/ML
4 INJECTION INTRAMUSCULAR; INTRAVENOUS EVERY 6 HOURS PRN
Status: DISCONTINUED | OUTPATIENT
Start: 2020-09-29 | End: 2020-10-01 | Stop reason: HOSPADM

## 2020-09-29 RX ORDER — LABETALOL 20 MG/4 ML (5 MG/ML) INTRAVENOUS SYRINGE
5 EVERY 10 MIN PRN
Status: DISCONTINUED | OUTPATIENT
Start: 2020-09-29 | End: 2020-09-29 | Stop reason: HOSPADM

## 2020-09-29 RX ORDER — ACETAMINOPHEN 500 MG
1000 TABLET ORAL EVERY 8 HOURS SCHEDULED
Status: DISCONTINUED | OUTPATIENT
Start: 2020-09-29 | End: 2020-10-01 | Stop reason: HOSPADM

## 2020-09-29 RX ORDER — SENNA AND DOCUSATE SODIUM 50; 8.6 MG/1; MG/1
1 TABLET, FILM COATED ORAL 2 TIMES DAILY
Status: DISCONTINUED | OUTPATIENT
Start: 2020-09-29 | End: 2020-10-01 | Stop reason: HOSPADM

## 2020-09-29 RX ORDER — MEPERIDINE HYDROCHLORIDE 25 MG/ML
12.5 INJECTION INTRAMUSCULAR; INTRAVENOUS; SUBCUTANEOUS EVERY 5 MIN PRN
Status: DISCONTINUED | OUTPATIENT
Start: 2020-09-29 | End: 2020-09-29 | Stop reason: HOSPADM

## 2020-09-29 RX ORDER — DIPHENHYDRAMINE HYDROCHLORIDE 50 MG/ML
12.5 INJECTION INTRAMUSCULAR; INTRAVENOUS
Status: DISCONTINUED | OUTPATIENT
Start: 2020-09-29 | End: 2020-09-29 | Stop reason: HOSPADM

## 2020-09-29 RX ORDER — SODIUM CHLORIDE 0.9 % (FLUSH) 0.9 %
10 SYRINGE (ML) INJECTION PRN
Status: DISCONTINUED | OUTPATIENT
Start: 2020-09-29 | End: 2020-10-01 | Stop reason: HOSPADM

## 2020-09-29 RX ORDER — MAGNESIUM HYDROXIDE 1200 MG/15ML
LIQUID ORAL CONTINUOUS PRN
Status: COMPLETED | OUTPATIENT
Start: 2020-09-29 | End: 2020-09-29

## 2020-09-29 RX ORDER — LISINOPRIL 20 MG/1
20 TABLET ORAL DAILY
Status: DISCONTINUED | OUTPATIENT
Start: 2020-09-29 | End: 2020-09-29

## 2020-09-29 RX ORDER — SODIUM CHLORIDE 0.9 % (FLUSH) 0.9 %
10 SYRINGE (ML) INJECTION EVERY 12 HOURS SCHEDULED
Status: DISCONTINUED | OUTPATIENT
Start: 2020-09-29 | End: 2020-10-01 | Stop reason: HOSPADM

## 2020-09-29 RX ORDER — PROCHLORPERAZINE EDISYLATE 5 MG/ML
5 INJECTION INTRAMUSCULAR; INTRAVENOUS
Status: DISCONTINUED | OUTPATIENT
Start: 2020-09-29 | End: 2020-09-29 | Stop reason: HOSPADM

## 2020-09-29 RX ORDER — OXYCODONE HYDROCHLORIDE 5 MG/1
10 TABLET ORAL EVERY 4 HOURS PRN
Status: DISCONTINUED | OUTPATIENT
Start: 2020-09-29 | End: 2020-10-01 | Stop reason: HOSPADM

## 2020-09-29 RX ADMIN — PROPOFOL 125 MCG/KG/MIN: 10 INJECTION, EMULSION INTRAVENOUS at 13:41

## 2020-09-29 RX ADMIN — SODIUM CHLORIDE, SODIUM LACTATE, POTASSIUM CHLORIDE, AND CALCIUM CHLORIDE: 600; 310; 30; 20 INJECTION, SOLUTION INTRAVENOUS at 13:38

## 2020-09-29 RX ADMIN — METHOCARBAMOL TABLETS 500 MG: 500 TABLET, COATED ORAL at 22:35

## 2020-09-29 RX ADMIN — EPHEDRINE SULFATE 10 MG: 50 INJECTION, SOLUTION INTRAMUSCULAR; INTRAVENOUS; SUBCUTANEOUS at 14:19

## 2020-09-29 RX ADMIN — FENTANYL CITRATE 50 MCG: 50 INJECTION, SOLUTION INTRAMUSCULAR; INTRAVENOUS at 16:32

## 2020-09-29 RX ADMIN — VANCOMYCIN HYDROCHLORIDE 1000 MG: 10 INJECTION, POWDER, LYOPHILIZED, FOR SOLUTION INTRAVENOUS at 11:26

## 2020-09-29 RX ADMIN — EPHEDRINE SULFATE 10 MG: 50 INJECTION, SOLUTION INTRAMUSCULAR; INTRAVENOUS; SUBCUTANEOUS at 13:54

## 2020-09-29 RX ADMIN — LIDOCAINE HYDROCHLORIDE 100 MG: 20 INJECTION, SOLUTION INTRAVENOUS at 13:39

## 2020-09-29 RX ADMIN — Medication 1000 UNITS: at 22:35

## 2020-09-29 RX ADMIN — LIDOCAINE HYDROCHLORIDE 50 MG: 20 INJECTION, SOLUTION INTRAVENOUS at 14:02

## 2020-09-29 RX ADMIN — SODIUM CHLORIDE, SODIUM LACTATE, POTASSIUM CHLORIDE, AND CALCIUM CHLORIDE: 600; 310; 30; 20 INJECTION, SOLUTION INTRAVENOUS at 11:11

## 2020-09-29 RX ADMIN — FENTANYL CITRATE 50 MCG: 50 INJECTION, SOLUTION INTRAMUSCULAR; INTRAVENOUS at 16:26

## 2020-09-29 RX ADMIN — FENTANYL CITRATE 100 MCG: 50 INJECTION, SOLUTION INTRAMUSCULAR; INTRAVENOUS at 11:57

## 2020-09-29 RX ADMIN — EPHEDRINE SULFATE 10 MG: 50 INJECTION, SOLUTION INTRAMUSCULAR; INTRAVENOUS; SUBCUTANEOUS at 14:13

## 2020-09-29 RX ADMIN — OXYCODONE 15 MG: 5 TABLET ORAL at 19:49

## 2020-09-29 RX ADMIN — SODIUM CHLORIDE: 9 INJECTION, SOLUTION INTRAVENOUS at 17:47

## 2020-09-29 RX ADMIN — ACETAMINOPHEN 1000 MG: 500 TABLET ORAL at 20:53

## 2020-09-29 RX ADMIN — DOCUSATE SODIUM 50 MG AND SENNOSIDES 8.6 MG 1 TABLET: 8.6; 5 TABLET, FILM COATED ORAL at 22:35

## 2020-09-29 RX ADMIN — HYDROMORPHONE HYDROCHLORIDE 0.5 MG: 1 INJECTION, SOLUTION INTRAMUSCULAR; INTRAVENOUS; SUBCUTANEOUS at 16:53

## 2020-09-29 RX ADMIN — CEFAZOLIN SODIUM 2 G: 2 SOLUTION INTRAVENOUS at 13:41

## 2020-09-29 RX ADMIN — LIDOCAINE HYDROCHLORIDE 100 MG: 20 INJECTION, SOLUTION INTRAVENOUS at 13:30

## 2020-09-29 RX ADMIN — CEFAZOLIN SODIUM 2 G: 2 SOLUTION INTRAVENOUS at 22:43

## 2020-09-29 RX ADMIN — LORAZEPAM 0.5 MG: 0.5 TABLET ORAL at 22:35

## 2020-09-29 RX ADMIN — LIDOCAINE HYDROCHLORIDE 50 MG: 20 INJECTION, SOLUTION INTRAVENOUS at 14:52

## 2020-09-29 RX ADMIN — ATORVASTATIN CALCIUM 40 MG: 20 TABLET, FILM COATED ORAL at 22:35

## 2020-09-29 RX ADMIN — TRANEXAMIC ACID 0.96 G: 1 INJECTION, SOLUTION INTRAVENOUS at 13:41

## 2020-09-29 RX ADMIN — METHOCARBAMOL TABLETS 500 MG: 500 TABLET, COATED ORAL at 16:53

## 2020-09-29 RX ADMIN — Medication 10 ML: at 22:44

## 2020-09-29 RX ADMIN — GABAPENTIN 100 MG: 100 CAPSULE ORAL at 22:35

## 2020-09-29 ASSESSMENT — PULMONARY FUNCTION TESTS
PIF_VALUE: 0
PIF_VALUE: 1
PIF_VALUE: 0
PIF_VALUE: 1
PIF_VALUE: 1
PIF_VALUE: 0
PIF_VALUE: 0
PIF_VALUE: 1
PIF_VALUE: 0
PIF_VALUE: 1
PIF_VALUE: 1
PIF_VALUE: 0
PIF_VALUE: 1
PIF_VALUE: 0
PIF_VALUE: 1
PIF_VALUE: 0
PIF_VALUE: 1
PIF_VALUE: 0
PIF_VALUE: 1
PIF_VALUE: 0
PIF_VALUE: 1
PIF_VALUE: 0
PIF_VALUE: 1
PIF_VALUE: 0
PIF_VALUE: 0
PIF_VALUE: 1
PIF_VALUE: 0
PIF_VALUE: 1
PIF_VALUE: 0
PIF_VALUE: 1
PIF_VALUE: 0
PIF_VALUE: 1
PIF_VALUE: 0
PIF_VALUE: 1
PIF_VALUE: 0
PIF_VALUE: 1
PIF_VALUE: 0
PIF_VALUE: 1
PIF_VALUE: 0
PIF_VALUE: 1
PIF_VALUE: 0
PIF_VALUE: 0
PIF_VALUE: 1

## 2020-09-29 ASSESSMENT — PAIN DESCRIPTION - DESCRIPTORS
DESCRIPTORS: ACHING
DESCRIPTORS: ACHING

## 2020-09-29 ASSESSMENT — PAIN DESCRIPTION - ONSET
ONSET: ON-GOING
ONSET: ON-GOING

## 2020-09-29 ASSESSMENT — PAIN DESCRIPTION - PROGRESSION
CLINICAL_PROGRESSION: NOT CHANGED
CLINICAL_PROGRESSION: NOT CHANGED

## 2020-09-29 ASSESSMENT — PAIN SCALES - GENERAL
PAINLEVEL_OUTOF10: 10
PAINLEVEL_OUTOF10: 10
PAINLEVEL_OUTOF10: 7
PAINLEVEL_OUTOF10: 9
PAINLEVEL_OUTOF10: 4
PAINLEVEL_OUTOF10: 6
PAINLEVEL_OUTOF10: 5

## 2020-09-29 ASSESSMENT — PAIN DESCRIPTION - LOCATION
LOCATION: KNEE
LOCATION: KNEE

## 2020-09-29 ASSESSMENT — PAIN DESCRIPTION - PAIN TYPE
TYPE: SURGICAL PAIN
TYPE: SURGICAL PAIN

## 2020-09-29 ASSESSMENT — PAIN DESCRIPTION - FREQUENCY
FREQUENCY: CONTINUOUS
FREQUENCY: CONTINUOUS

## 2020-09-29 ASSESSMENT — PAIN DESCRIPTION - ORIENTATION
ORIENTATION: RIGHT
ORIENTATION: RIGHT

## 2020-09-29 ASSESSMENT — PAIN - FUNCTIONAL ASSESSMENT: PAIN_FUNCTIONAL_ASSESSMENT: 0-10

## 2020-09-29 NOTE — ANESTHESIA PROCEDURE NOTES
Epidural Block    Patient location during procedure: procedural area  Start time: 9/29/2020 12:30 PM  End time: 9/29/2020 12:32 PM  Reason for block: post-op pain management  Staffing  Anesthesiologist: Nikki Ferrer MD  Preanesthetic Checklist  Completed: patient identified, site marked, surgical consent, pre-op evaluation, timeout performed, IV checked, risks and benefits discussed, monitors and equipment checked, anesthesia consent given, oxygen available and patient being monitored  Epidural  Patient position: sitting  Prep: Betadine  Patient monitoring: cardiac monitor, continuous pulse ox and frequent blood pressure checks  Approach: midline  Location: lumbar (1-5)  Injection technique: SANDRA saline  Provider prep: mask and sterile gloves  Needle  Needle type: Tuohy   Needle gauge: 17 G  Needle length: 6 in  Needle insertion depth: 5 cm  Catheter type: end hole  Catheter size: 18 G  Catheter at skin depth: 13 cm  Test dose: negative  Assessment  Hemodynamics: stable  Attempts: 1

## 2020-09-29 NOTE — PROGRESS NOTES
PACU Transfer Note    Vitals:    09/29/20 1802   BP: 98/63   Pulse: 84   Resp: 14   Temp: 98 °F (36.7 °C)   SpO2: 94%       In: 747 [P.O.:444; I.V.:303]  Out: 0     Pain assessment:  present - adequately treated  Pain Level: 4    Report given to Receiving unit TI Hodgson.    9/29/2020 6:07 PM

## 2020-09-29 NOTE — PROGRESS NOTES
Patient admitted to 5 T from PACU. Patient is alert and oriented. Vital signs are stable at this time.

## 2020-09-29 NOTE — ANESTHESIA PRE PROCEDURE
Department of Anesthesiology  Preprocedure Note       Name:  Williams Hernandez   Age:  70 y.o.  :  1945                                          MRN:  [id number]         Date:  2020      Surgeon: Obed Segal):  Austyn Roe MD    Procedure: Procedure(s):  RIGHT TOTAL KNEE ARTHROPLASTY REVISION    Medications prior to admission:   Prior to Admission medications    Medication Sig Start Date End Date Taking? Authorizing Provider   gabapentin (NEURONTIN) 100 MG capsule Take 1 capsule by mouth 3 times daily for 30 days. .  Patient taking differently: Take 100 mg by mouth daily. 18 Yes Aga Steele MD   atorvastatin (LIPITOR) 40 MG tablet Take 1 tablet by mouth nightly 18  Yes Aga Steele MD   acetaminophen (TYLENOL) 325 MG tablet Take 650 mg by mouth every 6 hours as needed for Pain   Yes Historical Provider, MD   LORazepam (ATIVAN) 1 MG tablet Take 0.5 mg by mouth nightly as needed (sleep).  .   Yes Historical Provider, MD   citalopram (CELEXA) 40 MG tablet Take 40 mg by mouth daily   Yes Historical Provider, MD   pantoprazole (PROTONIX) 40 MG tablet Take 40 mg by mouth daily   Yes Historical Provider, MD   lisinopril (PRINIVIL;ZESTRIL) 20 MG tablet Take 20 mg by mouth daily   Yes Historical Provider, MD   hydrochlorothiazide (HYDRODIURIL) 25 MG tablet Take 1 tablet by mouth daily 18   Aga Steele MD   aspirin 81 MG tablet Take 1 tablet by mouth daily 18   Aga Steele MD   vitamin D (CHOLECALCIFEROL) 1000 UNIT TABS tablet Take 1,000 Units by mouth daily    Historical Provider, MD   Prenatal Vit-Fe Fumarate-FA (PRENATAL 1 PLUS 1 PO) Take by mouth    Historical Provider, MD       Current medications:    Current Facility-Administered Medications   Medication Dose Route Frequency Provider Last Rate Last Dose    ceFAZolin (ANCEF) 2 g in dextrose 3 % 50 mL IVPB (duplex)  2 g Intravenous Once Austyn Roe MD        vancomycin (VANCOCIN) 1,000 mg in dextrose 5 % 250 [id number], [id number], BEART, [id number]     Type & Screen (If Applicable):  No results found for: LABABO, LABRH    Drug/Infectious Status (If Applicable):  No results found for: HIV, HEPCAB    COVID-19 Screening (If Applicable):   Lab Results   Component Value Date    COVID19 NOT DETECTED 09/23/2020         Anesthesia Evaluation    Airway: Mallampati: II  TM distance: >3 FB   Neck ROM: full  Mouth opening: > = 3 FB Dental:          Pulmonary:       (-) asthma                           Cardiovascular:  Exercise tolerance: good (>4 METS),   (+) hypertension:,     (-) past MI and  angina                Neuro/Psych:   (+) CVA:, TIA,    (-) seizures           GI/Hepatic/Renal:   (+) PUD,           Endo/Other:        (-) diabetes mellitus               Abdominal:           Vascular:                                        Anesthesia Plan      general and epidural     ASA 3     (-npo MN  -denies chest pain or palp. No sob      Echo 2018  Summary   Left ventricular cavity appears decreased with concentric hypertrophy of the   basal septum. Overall left ventricular systolic function appears normal with an estimated   ejection fraction of 55-60%. No regional wall motion abnormalities are noted. Diastolic filling   parameters suggests grade I diastolic dysfunction. Mitral annular calcification   Mild mitral and aortic regurgitation   Moderate tricuspid regurgitation.)  Induction: intravenous. MIPS: Postoperative opioids intended. Anesthetic plan and risks discussed with patient. Plan discussed with CRNA.                 Yumiko Vyas MD   9/29/2020

## 2020-09-29 NOTE — PROGRESS NOTES
4 Eyes Admission Assessment     I agree as the admission nurse that 2 RN's have performed a thorough Head to Toe Skin Assessment on the patient. ALL assessment sites listed below have been assessed on admission. Areas assessed by both nurses:   [x]   Head, Face, and Ears   [x]   Shoulders, Back, and Chest  [x]   Arms, Elbows, and Hands   [x]   Coccyx, Sacrum, and Ischium  [x]   Legs, Feet, and Heels        Does the Patient have Skin Breakdown? Healing scabs to left foot/big toe.          Kaveh Prevention initiated:  NO  Wound Care Orders initiated: NO      WOC nurse consulted for Pressure Injury (Stage 3,4, Unstageable, DTI, NWPT, and Complex wounds) or Kaveh score 18 or lower:  NO    Nurse 1 eSignature: Electronically signed by Aidee Elliott RN on 9/29/20 at 7:11 PM EDT    SHARE this note so that the co-signing nurse is able to place an eSignature    Nurse 2 eSignature: Electronically signed by Quinton Escoto RN on 9/30/20 at 7:09 AM EDT

## 2020-09-29 NOTE — BRIEF OP NOTE
Brief Postoperative Note      Patient: Agustín Worthy  YOB: 1945  MRN: [id number]    Date of Procedure: 9/29/2020    Pre-Op Diagnosis: Mechanical loosening of internal right knee prosthetic joint, initial encounter ([facility] [address] Leopold Borne    Post-Op Diagnosis: Same       Procedure(s):  RIGHT TOTAL KNEE ARTHROPLASTY REVISION    Surgeon(s):  MD Iban Rosales MD    Assistant:  Surgical Assistant: Ankita Barragan  Physician Assistant: Jaret Vu    Anesthesia: Regional    Estimated Blood Loss (mL): Minimal    Complications: None    Specimens:   ID Type Source Tests Collected by Time Destination   1 : Joint Fluid, Right Knee Joint/Joint Fluid Joint Fluid BODY FLUID CELL COUNT WITH DIFFERENTIAL Ady Trotter MD 9/29/2020 1344    2 : Synovial Tissue, Right Knee Tissue Tissue CULTURE, TISSUE Ady Trotter MD 9/29/2020 1410    3 : Femoral Tissue, Right Knee Tissue Tissue CULTURE, TISSUE Ady Trotter MD 9/29/2020 1413    4 : Patellar Synovium, Right Knee Tissue Tissue CULTURE, TISSUE Ady Trotter MD 9/29/2020 1427        Implants:  * No implants in log *      Drains: * No LDAs found *    Findings: none    Electronically signed by Ady Trotter MD on 9/29/2020 at 2:52 PM

## 2020-09-29 NOTE — ANESTHESIA POSTPROCEDURE EVALUATION
Department of Anesthesiology  Postprocedure Note    Patient: Cindy Miller  MRN: [id number]  YOB: 1945  Date of evaluation: 9/29/2020  Time:  6:12 PM     Procedure Summary     Date:  09/29/20 Room / Location:  [address]    Anesthesia Start:  5981 Anesthesia Stop:  5527    Procedure:  RIGHT TOTAL KNEE ARTHROPLASTY REVISION (Right Knee) Diagnosis:       Mechanical loosening of internal right knee prosthetic joint, [facility] ([facility]      (Mechanical loosening of internal right knee prosthetic joint, initial encounter [facility]) [X48.909K])    Surgeon:  Ivonne Ray MD Responsible Provider:  Stephanie Godwin DO    Anesthesia Type:  general, epidural ASA Status:  3          Anesthesia Type: general, epidural    Michela Phase I: Michela Score: 9    Michela Phase II:      Last vitals: Reviewed and per EMR flowsheets.        Anesthesia Post Evaluation    Patient location during evaluation: PACU  Patient participation: complete - patient participated  Level of consciousness: awake and alert  Pain score: 3  Airway patency: patent  Nausea & Vomiting: no nausea and no vomiting  Complications: no  Cardiovascular status: hemodynamically stable  Respiratory status: acceptable  Hydration status: stable

## 2020-09-29 NOTE — CONSULTS
Hospital Medicine Initial Consultation    PCP: Rafal Pang MD    Date of Admission: 9/29/2020    Date of Service: 9/29/2020    Pt seen/examined on 9/29/2020    Consulting Physician: Dr. Andre Viramontes Service: Orthopedic surgery    Chief Complaint:  R knee pain    History Of Present Illness:      70 y.o. female who presented to [facility]. with R knee pain, a/w instability, chronic, persistent despite conservative treatment. Past Medical History:          Diagnosis Date    Anesthesia     disoriented post op    Arthritis     Cerebral artery occlusion with cerebral infarction ([address]     TIA    Gastric ulcer     History of esophagogastroduodenoscopy     Hyperlipidemia     Hypertension     Kidney stone     Osteoporosis        Past Surgical History:          Procedure Laterality Date    BREAST SURGERY      tumor removed    CHOLECYSTECTOMY      DILATION AND CURETTAGE OF UTERUS      FOOT SURGERY      GASTRIC BYPASS SURGERY      HYSTERECTOMY      JOINT REPLACEMENT Bilateral     knees    LITHOTRIPSY      NOSE SURGERY      OTHER SURGICAL HISTORY Left 07/11/2017     LEFT TOTAL KNEE ARTHROPLSTY REVISION      OTHER SURGICAL HISTORY Right 10/24/2017    REVISION RIGHT TOTAL KNEE ARTHROPLASTY       Medications Prior to Admission:      Prior to Admission medications    Medication Sig Start Date End Date Taking? Authorizing Provider   sulindac (CLINORIL) 150 MG tablet Take 1 tablet by mouth 2 times daily 9/29/20  Yes Ursula Cowart MD   oxyCODONE (ROXICODONE) 5 MG immediate release tablet Take 1 tablet by mouth every 6 hours as needed for Pain for up to 7 days. Intended supply: 7 days. Take lowest dose possible to manage pain 9/29/20 10/6/20 Yes Ursula Cowart MD   gabapentin (NEURONTIN) 100 MG capsule Take 1 capsule by mouth 3 times daily for 30 days. . 6/13/18 9/25/20 Yes Osiris Boland MD   atorvastatin (LIPITOR) 40 MG tablet Take 1 tablet by mouth nightly 6/13/18  Yes Osiris Boland MD hydrochlorothiazide (HYDRODIURIL) 25 MG tablet Take 1 tablet by mouth daily 6/13/18  Yes Michael Chapa MD   aspirin 81 MG tablet Take 1 tablet by mouth daily 6/13/18  Yes Michael Chapa MD   vitamin D (CHOLECALCIFEROL) 1000 UNIT TABS tablet Take 1,000 Units by mouth daily   Yes Historical Provider, MD   acetaminophen (TYLENOL) 325 MG tablet Take 650 mg by mouth every 6 hours as needed for Pain   Yes Historical Provider, MD   LORazepam (ATIVAN) 1 MG tablet Take 0.5 mg by mouth nightly as needed (sleep). .   Yes Historical Provider, MD   citalopram (CELEXA) 40 MG tablet Take 40 mg by mouth daily   Yes Historical Provider, MD   Prenatal Vit-Fe Fumarate-FA (PRENATAL 1 PLUS 1 PO) Take by mouth   Yes Historical Provider, MD   pantoprazole (PROTONIX) 40 MG tablet Take 40 mg by mouth daily   Yes Historical Provider, MD   lisinopril (PRINIVIL;ZESTRIL) 20 MG tablet Take 20 mg by mouth daily   Yes Historical Provider, MD       Allergies:  Sulfa antibiotics    Social History:      TOBACCO:   reports that she quit smoking about 43 years ago. She quit after 10.00 years of use. She has never used smokeless tobacco.  ETOH:   reports previous alcohol use. Family History:      Reviewed in detail and negative except as follows:    History reviewed. No pertinent family history. REVIEW OF SYSTEMS:   Pertinent positives and negatives as noted in the HPI. All other systems reviewed and negative. PHYSICAL EXAM PERFORMED:    /74   Pulse 82   Temp 97.4 °F (36.3 °C) (Oral)   Resp 14   Ht 4' 11\" (1.499 m)   Wt 128 lb (58.1 kg)   SpO2 96%   BMI 25.85 kg/m²     General appearance:  No apparent distress, appears stated age  Eyes: Pupils equal, round, reactive to light, conjunctiva/corneas clear  Ears/Nose/Mouth/Throat: No external lesions or scars, hearing intact to voice  Neck: Trachea midline, no masses noted, no thyromegaly  Respiratory:  Normal respiratory effort.  Clear to auscultation bilaterally  Cardiovascular: Regular rate and rhythm, nl S1/S2, w/o murmurs, rubs or gallops, no lower extremity edema  Abdomen: Soft, non-tender, non-distended, no hepatosplenomegaly  Musculoskeletal: No cyanosis, clubbing or petechiae, no lower extremity misalignment, asymmetry, or crepitation, surgical site bandaged  Skin: Normal skin color, texture, turgor. No rashes or lesions noted. Psychiatric: Alert and oriented x4, good insight and judgment    Labs:     Recent Labs     09/29/20  1105   WBC 4.4   HGB 12.0   HCT 36.5        Recent Labs     09/29/20  1105      K 4.7      CO2 26   BUN 17   CREATININE 0.7   CALCIUM 9.1     Recent Labs     09/29/20  1105   AST 24   ALT 13   BILITOT 0.4   ALKPHOS 138*     No results for input(s): INR in the last 72 hours. No results for input(s): Jocelyne Patti in the last 72 hours. Urinalysis:    No results found for: Bonna Greet, BACTERIA, RBCUA, BLOODU, [address], [address]    Radiology:    XR KNEE RIGHT (1-2 VIEWS)   Final Result      2 views demonstrate cemented, long stem knee arthroplasty in standard position with postoperative changes. There is osteopenia. ASSESSMENT:    Active Hospital Problems    Diagnosis Date Noted    Mechanical loosening of internal right knee prosthetic joint [facility]) Dada Median 10/25/2017       PLAN:    # Osteoarthritis s/p R total knee arthroplasty revision  -periop ABx and analgesia per primary    # HTN  -would hold lisinopril, HCTZ for now given soft BP  -continue asa, statin    # HLD  -continue home statin    # GERD  -continue home protonix    DVT Prophylaxis: per primary  Diet: DIET GENERAL;  Code Status: Full Code    PT/OT Eval Status: per primary    Dispo - per primary, no barriers to dc from internal medicine perspective     Nita León MD    Thank you Dr. Richard Ponce for the opportunity to be involved in this patient's care. If you have any questions or concerns please feel free to contact me at [phone].

## 2020-09-29 NOTE — H&P
Jillian Lombardi    [id number]    [facility] Same Day Surgery Update H & P  Department of General Surgery   Surgical Service   Pre-operative History and Physical  Last H & P within the last 30 days. DIAGNOSIS:   Mechanical loosening of internal right knee prosthetic joint, initial encounter ([facility] [address] [T84.032A]    Procedure(s):  RIGHT TOTAL KNEE ARTHROPLASTY REVISION     HISTORY OF PRESENT ILLNESS:   Patient has chronic right knee pain and instability. S/P right TKR 2017  The symptoms have been recalcitrant to conservative treatment and the patient presents today for the above procedure. Covid 19:  Patient denies fever, chills, cough or known exposure to Covid-19. Patient reports they have been quarantined at home since Covid-19 test.      Past Medical History:        Diagnosis Date    Anesthesia     disoriented post op    Arthritis     Cerebral artery occlusion with cerebral infarction ([facility] [address]     TIA    Gastric ulcer     History of esophagogastroduodenoscopy     Hyperlipidemia     Hypertension     Kidney stone     Osteoporosis      Past Surgical History:        Procedure Laterality Date    BREAST SURGERY      tumor removed    CHOLECYSTECTOMY      DILATION AND CURETTAGE OF UTERUS      FOOT SURGERY      GASTRIC BYPASS SURGERY      HYSTERECTOMY      JOINT REPLACEMENT Bilateral     knees    LITHOTRIPSY      NOSE SURGERY      OTHER SURGICAL HISTORY Left 07/11/2017     LEFT TOTAL KNEE ARTHROPLSTY REVISION      OTHER SURGICAL HISTORY Right 10/24/2017    REVISION RIGHT TOTAL KNEE ARTHROPLASTY     Past Social History:  Social History     Socioeconomic History    Marital status:       Spouse name: None    Number of children: None    Years of education: None    Highest education level: None   Occupational History    None   Social Needs    Financial resource strain: None    Food insecurity     Worry: None     Inability: None    Transportation needs     Medical: None     Non-medical: None   Tobacco Use    Smoking status: Former Smoker     Years: 10.00     Last attempt to quit: 1977     Years since quittin.2    Smokeless tobacco: Never Used   Substance and Sexual Activity    Alcohol use: Not Currently    Drug use: No    Sexual activity: None   Lifestyle    Physical activity     Days per week: None     Minutes per session: None    Stress: None   Relationships    Social connections     Talks on phone: None     Gets together: None     Attends [employer] service: None     Active member of club or organization: None     Attends meetings of clubs or organizations: None     Relationship status: None    Intimate partner violence     Fear of current or ex partner: None     Emotionally abused: None     Physically abused: None     Forced sexual activity: None   Other Topics Concern    None   Social History Narrative    None         Medications Prior to Admission:      Prior to Admission medications    Medication Sig Start Date End Date Taking? Authorizing Provider   gabapentin (NEURONTIN) 100 MG capsule Take 1 capsule by mouth 3 times daily for 30 days. .  Patient taking differently: Take 100 mg by mouth daily. 18 Yes Derek Gallegos MD   atorvastatin (LIPITOR) 40 MG tablet Take 1 tablet by mouth nightly 18  Yes Derek Gallegos MD   acetaminophen (TYLENOL) 325 MG tablet Take 650 mg by mouth every 6 hours as needed for Pain   Yes Historical Provider, MD   LORazepam (ATIVAN) 1 MG tablet Take 0.5 mg by mouth nightly as needed (sleep).  .   Yes Historical Provider, MD   citalopram (CELEXA) 40 MG tablet Take 40 mg by mouth daily   Yes Historical Provider, MD   pantoprazole (PROTONIX) 40 MG tablet Take 40 mg by mouth daily   Yes Historical Provider, MD   lisinopril (PRINIVIL;ZESTRIL) 20 MG tablet Take 20 mg by mouth daily   Yes Historical Provider, MD   hydrochlorothiazide (HYDRODIURIL) 25 MG tablet Take 1 tablet by mouth daily 18   Derek Gallegos MD   aspirin 81 MG tablet Take 1 tablet by mouth daily 6/13/18   Mansi Medellin MD   vitamin D (CHOLECALCIFEROL) 1000 UNIT TABS tablet Take 1,000 Units by mouth daily    Historical Provider, MD   Prenatal Vit-Fe Fumarate-FA (PRENATAL 1 PLUS 1 PO) Take by mouth    Historical Provider, MD         Allergies:  Sulfa antibiotics    PHYSICAL EXAM:      /77   Pulse 73   Temp 97.6 °F (36.4 °C) (Oral)   Resp 16   Ht 4' 11\" (1.499 m)   Wt 128 lb (58.1 kg)   SpO2 98%   BMI 25.85 kg/m²      Airway:  Airway patent with no audible stridor    Heart:  Regular rate and rhythm, No murmur noted    Lungs:  No increased work of breathing, good air exchange, clear to auscultation bilaterally, no crackles or wheezing    Abdomen:  Soft, non-distended, non-tender, normal active bowel sounds, no masses palpated    ASSESSMENT AND PLAN    Patient is a 70 y.o. female with above specified procedure planned. 1.  Patient seen and focused exam done today- no new changes since last physical exam on 9-    2. Access to ancillary services are available per request of the provider.     Domingo Barrientos     9/29/2020

## 2020-09-29 NOTE — PROGRESS NOTES
Assisted Dr. Peter Roberts with epidural. Patient's vitals remained stable during procedure. Resting comfortably in bed. Bed put in lowest position. Call light within reach.

## 2020-09-29 NOTE — PROGRESS NOTES
1537 Admitted to PACU from [address] [place] [address]. Connected to monitor. Report at bedside. Reports pain - treated per CRNA through epidural then epidural d/c'd. Reports site oozy.

## 2020-09-30 LAB
HCT VFR BLD CALC: 29 % (ref 36–48)
HEMOGLOBIN: 9.6 G/DL (ref 12–16)

## 2020-09-30 PROCEDURE — 97110 THERAPEUTIC EXERCISES: CPT

## 2020-09-30 PROCEDURE — 85018 HEMOGLOBIN: CPT

## 2020-09-30 PROCEDURE — 90686 IIV4 VACC NO PRSV 0.5 ML IM: CPT | Performed by: ORTHOPAEDIC SURGERY

## 2020-09-30 PROCEDURE — 97162 PT EVAL MOD COMPLEX 30 MIN: CPT

## 2020-09-30 PROCEDURE — 6370000000 HC RX 637 (ALT 250 FOR IP): Performed by: ORTHOPAEDIC SURGERY

## 2020-09-30 PROCEDURE — G0378 HOSPITAL OBSERVATION PER HR: HCPCS

## 2020-09-30 PROCEDURE — 97535 SELF CARE MNGMENT TRAINING: CPT

## 2020-09-30 PROCEDURE — 96375 TX/PRO/DX INJ NEW DRUG ADDON: CPT

## 2020-09-30 PROCEDURE — 6360000002 HC RX W HCPCS: Performed by: PHYSICIAN ASSISTANT

## 2020-09-30 PROCEDURE — 85014 HEMATOCRIT: CPT

## 2020-09-30 PROCEDURE — 36415 COLL VENOUS BLD VENIPUNCTURE: CPT

## 2020-09-30 PROCEDURE — 97166 OT EVAL MOD COMPLEX 45 MIN: CPT

## 2020-09-30 PROCEDURE — 96376 TX/PRO/DX INJ SAME DRUG ADON: CPT

## 2020-09-30 PROCEDURE — G0008 ADMIN INFLUENZA VIRUS VAC: HCPCS | Performed by: ORTHOPAEDIC SURGERY

## 2020-09-30 PROCEDURE — 6360000002 HC RX W HCPCS: Performed by: ORTHOPAEDIC SURGERY

## 2020-09-30 PROCEDURE — 97530 THERAPEUTIC ACTIVITIES: CPT

## 2020-09-30 PROCEDURE — 96365 THER/PROPH/DIAG IV INF INIT: CPT

## 2020-09-30 PROCEDURE — 2580000003 HC RX 258: Performed by: INTERNAL MEDICINE

## 2020-09-30 PROCEDURE — 97116 GAIT TRAINING THERAPY: CPT

## 2020-09-30 RX ORDER — 0.9 % SODIUM CHLORIDE 0.9 %
250 INTRAVENOUS SOLUTION INTRAVENOUS ONCE
Status: COMPLETED | OUTPATIENT
Start: 2020-09-30 | End: 2020-09-30

## 2020-09-30 RX ORDER — GABAPENTIN 300 MG/1
300 CAPSULE ORAL DAILY
COMMUNITY

## 2020-09-30 RX ORDER — KETOROLAC TROMETHAMINE 30 MG/ML
15 INJECTION, SOLUTION INTRAMUSCULAR; INTRAVENOUS EVERY 6 HOURS
Status: COMPLETED | OUTPATIENT
Start: 2020-09-30 | End: 2020-09-30

## 2020-09-30 RX ADMIN — CITALOPRAM HYDROBROMIDE 40 MG: 20 TABLET ORAL at 08:22

## 2020-09-30 RX ADMIN — HYDROMORPHONE HYDROCHLORIDE 0.5 MG: 1 INJECTION, SOLUTION INTRAMUSCULAR; INTRAVENOUS; SUBCUTANEOUS at 12:36

## 2020-09-30 RX ADMIN — PANTOPRAZOLE SODIUM 40 MG: 40 TABLET, DELAYED RELEASE ORAL at 08:22

## 2020-09-30 RX ADMIN — APIXABAN 2.5 MG: 2.5 TABLET, FILM COATED ORAL at 08:22

## 2020-09-30 RX ADMIN — CEFAZOLIN SODIUM 2 G: 2 SOLUTION INTRAVENOUS at 06:11

## 2020-09-30 RX ADMIN — ATORVASTATIN CALCIUM 40 MG: 20 TABLET, FILM COATED ORAL at 21:23

## 2020-09-30 RX ADMIN — OXYCODONE 15 MG: 5 TABLET ORAL at 05:48

## 2020-09-30 RX ADMIN — SODIUM CHLORIDE 250 ML: 900 INJECTION, SOLUTION INTRAVENOUS at 00:45

## 2020-09-30 RX ADMIN — METHOCARBAMOL TABLETS 500 MG: 500 TABLET, COATED ORAL at 17:24

## 2020-09-30 RX ADMIN — GABAPENTIN 100 MG: 100 CAPSULE ORAL at 13:58

## 2020-09-30 RX ADMIN — ACETAMINOPHEN 1000 MG: 500 TABLET ORAL at 13:57

## 2020-09-30 RX ADMIN — ONDANSETRON 4 MG: 2 INJECTION INTRAMUSCULAR; INTRAVENOUS at 19:21

## 2020-09-30 RX ADMIN — OXYCODONE 15 MG: 5 TABLET ORAL at 21:23

## 2020-09-30 RX ADMIN — INFLUENZA A VIRUS A/VICTORIA/2454/2019 IVR-207 (H1N1) ANTIGEN (PROPIOLACTONE INACTIVATED), INFLUENZA A VIRUS A/HONG KONG/2671/2019 IVR-208 (H3N2) ANTIGEN (PROPIOLACTONE INACTIVATED), INFLUENZA B VIRUS B/VICTORIA/705/2018 BVR-11 ANTIGEN (PROPIOLACTONE INACTIVATED), INFLUENZA B VIRUS B/PHUKET/3073/2013 BVR-1B ANTIGEN (PROPIOLACTONE INACTIVATED) 0.5 ML: 15; 15; 15; 15 INJECTION, SUSPENSION INTRAMUSCULAR at 08:23

## 2020-09-30 RX ADMIN — ACETAMINOPHEN 1000 MG: 500 TABLET ORAL at 07:26

## 2020-09-30 RX ADMIN — GABAPENTIN 100 MG: 100 CAPSULE ORAL at 08:22

## 2020-09-30 RX ADMIN — KETOROLAC TROMETHAMINE 15 MG: 30 INJECTION, SOLUTION INTRAMUSCULAR at 17:24

## 2020-09-30 RX ADMIN — APIXABAN 2.5 MG: 2.5 TABLET, FILM COATED ORAL at 21:23

## 2020-09-30 RX ADMIN — DOCUSATE SODIUM 50 MG AND SENNOSIDES 8.6 MG 1 TABLET: 8.6; 5 TABLET, FILM COATED ORAL at 21:23

## 2020-09-30 RX ADMIN — OXYCODONE 15 MG: 5 TABLET ORAL at 16:03

## 2020-09-30 RX ADMIN — Medication 1000 UNITS: at 08:22

## 2020-09-30 RX ADMIN — DOCUSATE SODIUM 50 MG AND SENNOSIDES 8.6 MG 1 TABLET: 8.6; 5 TABLET, FILM COATED ORAL at 08:22

## 2020-09-30 RX ADMIN — KETOROLAC TROMETHAMINE 15 MG: 30 INJECTION, SOLUTION INTRAMUSCULAR at 08:24

## 2020-09-30 RX ADMIN — METHOCARBAMOL TABLETS 500 MG: 500 TABLET, COATED ORAL at 08:22

## 2020-09-30 RX ADMIN — METHOCARBAMOL TABLETS 500 MG: 500 TABLET, COATED ORAL at 12:42

## 2020-09-30 RX ADMIN — OXYCODONE 10 MG: 5 TABLET ORAL at 11:29

## 2020-09-30 ASSESSMENT — PAIN DESCRIPTION - DESCRIPTORS
DESCRIPTORS: ACHING

## 2020-09-30 ASSESSMENT — PAIN DESCRIPTION - ONSET
ONSET: ON-GOING

## 2020-09-30 ASSESSMENT — PAIN SCALES - GENERAL
PAINLEVEL_OUTOF10: 5
PAINLEVEL_OUTOF10: 4
PAINLEVEL_OUTOF10: 10
PAINLEVEL_OUTOF10: 6
PAINLEVEL_OUTOF10: 0
PAINLEVEL_OUTOF10: 5
PAINLEVEL_OUTOF10: 8
PAINLEVEL_OUTOF10: 7
PAINLEVEL_OUTOF10: 0
PAINLEVEL_OUTOF10: 6
PAINLEVEL_OUTOF10: 9
PAINLEVEL_OUTOF10: 10
PAINLEVEL_OUTOF10: 0
PAINLEVEL_OUTOF10: 10

## 2020-09-30 ASSESSMENT — PAIN DESCRIPTION - PROGRESSION
CLINICAL_PROGRESSION: NOT CHANGED
CLINICAL_PROGRESSION: GRADUALLY WORSENING
CLINICAL_PROGRESSION: GRADUALLY WORSENING
CLINICAL_PROGRESSION: NOT CHANGED
CLINICAL_PROGRESSION: GRADUALLY WORSENING
CLINICAL_PROGRESSION: NOT CHANGED
CLINICAL_PROGRESSION: NOT CHANGED
CLINICAL_PROGRESSION: GRADUALLY WORSENING
CLINICAL_PROGRESSION: NOT CHANGED
CLINICAL_PROGRESSION: GRADUALLY WORSENING

## 2020-09-30 ASSESSMENT — PAIN DESCRIPTION - PAIN TYPE
TYPE: SURGICAL PAIN

## 2020-09-30 ASSESSMENT — PAIN DESCRIPTION - LOCATION
LOCATION: KNEE

## 2020-09-30 ASSESSMENT — PAIN DESCRIPTION - ORIENTATION
ORIENTATION: RIGHT

## 2020-09-30 ASSESSMENT — PAIN DESCRIPTION - FREQUENCY
FREQUENCY: CONTINUOUS

## 2020-09-30 ASSESSMENT — PAIN - FUNCTIONAL ASSESSMENT: PAIN_FUNCTIONAL_ASSESSMENT: PREVENTS OR INTERFERES SOME ACTIVE ACTIVITIES AND ADLS

## 2020-09-30 NOTE — PLAN OF CARE
Problem: Falls - Risk of:  Goal: Will remain free from falls  Description: Will remain free from falls  Outcome: Ongoing  Patient remains free from falls during this shift. Patient is up x1 person assist with walker. Bed is in the lowest position and the bed alarm is activated. Anti-slip socks are on. Call light is within reach. Will continue to monitor and reassess. Problem: Pain:  Goal: Pain level will decrease  Description: Pain level will decrease  Outcome: Ongoing  RN assesses pain using 0-10 scale. Patient understands how to rate pain using 0-10 scale. Patient given PRN roxicodone for pain per MAR. Will continue to monitor and reassess.

## 2020-09-30 NOTE — PROGRESS NOTES
Department of Orthopedic Surgery     Progress Note    Subjective:   Admit Day:9/29/2020    Patient is comfortable appearing. Denies chest pain, shortness of air or calf pain. Tolerating PO. Objective[de-identified]    height is 4' 11\" (1.499 m) and weight is 128 lb (58.1 kg). Her oral temperature is 98.3 °F (36.8 °C). Her blood pressure is 117/76 and her pulse is 78. Her respiration is 16 and oxygen saturation is 93%. General:  No acute distress. Operative Incision:  Dressing is clean, dry and intact. Operative Extremity:  - Motor function intact to Tibialis Anterior, Gastroc-Soleus Complex, Extensor Hallucis Longus, and Flexor Hallucis Longus.  -  Sensation intact to light touch in sural, saphenous, superficial peroneal, deep peroneal and tibial nerve distributions. -  Toes are wwp with a palpable dorsalis pedis pulse. Negative Lenore's Sign Bilaterally    Labs:  Lab Results   Component Value Date    WBC 4.4 09/29/2020    HGB 9.6 09/30/2020    HCT 29.0 09/30/2020     09/29/2020       Lab Results   Component Value Date    INR 1.05 06/12/2018       Lab Results   Component Value Date     09/29/2020    K 4.7 09/29/2020    K 4.8 12/29/2018    CO2 26 09/29/2020    BUN 17 09/29/2020    CREATININE 0.7 09/29/2020    CALCIUM 9.1 09/29/2020       Assessment:   Principal Problem:    Mechanical loosening of internal right knee prosthetic joint ([address]  Resolved Problems:    * No resolved hospital problems. *      POD # 1 s/p Revision Femoral component TKA for loosening. Patient complains of increased pain. Will order two doses of toradol IV and will consult pharmacy for pain mgmt recommendations. Will continue to monitor progress with therapy today. Plan:   1. Weightbearing as tolerated   2. DVT Prophylaxis: Eliquis  3. PT/OT per protocol  4. Pain control: per protocol    Disposition:  Home vs SNF pending mobility/progress with therapy and medical stability.

## 2020-09-30 NOTE — PLAN OF CARE
Problem: Falls - Risk of:  Goal: Will remain free from falls  Description: Will remain free from falls  9/30/2020 1436 by Carney Apley, RN  Outcome: Ongoing  Note: Patient high fall risk and is up with assist x1 with the walker and gait belt. Patient alert and oriented x4, non skid socks on, bed in lowest position and locked, side rails up x2, call light and belongings within reach, bed alarm on for safety, fall sign posted. Will continue to monitor. Problem: Pain:  Goal: Pain level will decrease  Description: Pain level will decrease  9/30/2020 1436 by Carney Apley, RN  Outcome: Ongoing  Note: Pt continues to complain of pain but seems to be comfort able while up in the chair and lying in bed. Patient has also been able to fall asleep easily. PRN pain medication administered per request and doctors orders. Ice applied for comfort. Will continue to monitor.

## 2020-09-30 NOTE — PROGRESS NOTES
Hospitalist Progress Note      PCP: Robina Vasquez MD    Chief Complaint. Presented to hospital for right knee arthroplasty    Date of Admission: 9/29/2020      Subjective:   Patient could not be seen or examined today, cried to see patient twice, unsuccessful    Medications:  Reviewed    Infusion Medications    sodium chloride 125 mL/hr at 09/29/20 1747     Scheduled Medications    ketorolac  15 mg Intravenous Q6H    atorvastatin  40 mg Oral Nightly    citalopram  40 mg Oral Daily    gabapentin  100 mg Oral TID    pantoprazole  40 mg Oral Daily    Vitamin D  1,000 Units Oral Daily    sodium chloride flush  10 mL Intravenous 2 times per day    acetaminophen  1,000 mg Oral 3 times per day    sennosides-docusate sodium  1 tablet Oral BID    apixaban  2.5 mg Oral BID    methocarbamol  500 mg Oral 4x Daily     PRN Meds: LORazepam, sodium chloride flush, magnesium hydroxide, HYDROmorphone **OR** HYDROmorphone, ondansetron, oxyCODONE **OR** oxyCODONE      Intake/Output Summary (Last 24 hours) at 9/30/2020 1500  Last data filed at 9/30/2020 1236  Gross per 24 hour   Intake 2447 ml   Output 1050 ml   Net 1397 ml       Physical Exam Performed:    /67   Pulse 98   Temp 98.2 °F (36.8 °C) (Oral)   Resp 16   Ht 4' 11\" (1.499 m)   Wt 128 lb (58.1 kg)   SpO2 97%   BMI 25.85 kg/m²     Patient could not be examined, attempted twice to see patient    Labs:   Recent Labs     09/29/20  1105 09/30/20  0559   WBC 4.4  --    HGB 12.0 9.6*   HCT 36.5 29.0*     --      Recent Labs     09/29/20  1105      K 4.7      CO2 26   BUN 17   CREATININE 0.7   CALCIUM 9.1     Recent Labs     09/29/20  1105   AST 24   ALT 13   BILITOT 0.4   ALKPHOS 138*     No results for input(s): INR in the last 72 hours. No results for input(s): Verlena Jack in the last 72 hours.     Urinalysis:    No results found for: Paulino Bourdon, BACTERIA, RBCUA, BLOODU, [address], [address]    Radiology:  XR KNEE RIGHT (1-2 VIEWS)   Final Result      2 views demonstrate cemented, long stem knee arthroplasty in standard position with postoperative changes. There is osteopenia.             Assessment/Plan:    Active Hospital Problems    Diagnosis    Mechanical loosening of internal right knee prosthetic joint (HCC) [T84.032A]     Assessment  Osteoarthritis status post right knee arthroplasty revision  Acute Anemia of blood loss post operatively  Hypertension  Hyperlipidemia  GERD    Plan  Resume home anti-hypertensives at discharge  Continue statin, aspirin  Pain control per primary  Discharge planning by primary team  DVT Prophylaxis: Per primary  Diet: DIET GENERAL;  Code Status: Full Code    PT/OT Eval Status: ordered    Dispo -patient is stable for discharge from medical standpoint    Senia Mahajan MD

## 2020-09-30 NOTE — PROGRESS NOTES
Clinical Pharmacy Progress Note  Medication History     Admit Date: 9/29/2020    List of of current medications patient is taking is complete. Home Medication list in EPIC updated to reflect changes noted below. Source of information: interview with patient; verified with Ronnell Herndon report    [facility]'s home [facility]: [facility] ([phone])     Changes made to medication list:   Medications removed: (include reason, ex: therapy completed, inactive medication)   Aspirin - pt states she stopped taking ~1 year ago   HCTZ - pt states she no longer takes  Medication doses adjusted:    Lisinopril - pt states she takes 40 mg daily   Lorazepam - pt states she takes 1 mg QHS  Other notes:    Patient states she was taking \"Gabapentin 300 mg\"-- per [facility] report, she filled Gabapentin 300 mg capsules on 9/22/20 - Qty of #30 for 30-day supply (would be 300 mg daily). Updated med list to reflect this. Please call with any questions.   Carole Motley, PharmD, [facility]  Wireless # [phone]  9/30/2020 9:43 AM

## 2020-09-30 NOTE — PROGRESS NOTES
Patient alert and oriented x4, VSS, neuro checks WNL. Dressing to RLE CD&I, patient denies numbness or tingling. Patient continues to complain of 7/10-10/10 pain but is intermittently able to fall asleep. Ice applied for comfort. Patient ambulates well x1 assist with walker and gait belt. Bed alarm on for safety. Call light within reach. Will continue to monitor.

## 2020-09-30 NOTE — PROGRESS NOTES
Patient is alert and oriented. Vital signs are stable. Patient given PRN Roxicodone for pain per MAR. Patient ambulates x1 with a walker. Patient tolerates ambulation well. Patient voiding urine without complication. Bed is in the lowest position. Bed alarm is activated. Call light is within reach. Will continue to monitor and reassess.

## 2020-09-30 NOTE — CONSULTS
Clinical Pharmacy Consult Note    Admit date: 9/29/2020     Subjective/Objective:  69 yo F with PMH of CVA, gastric ulcer, HTN, kidney stones; admitted s/p Revision of femoral component of TKA (9/29). Pharmacy has been consulted to make pain medication recommendations by JAIME Azevedo. Patient's stated pain goal: 0/10, but she states that she knows this is not realistic. 9/30: Patient states she has a rough night due to hypotensive overnight. She states pain is currently controlled this AM after doses of oxycodone and ketorolac this AM. Ketorolac 15 mg IV q6h x 2 doses ordered this AM. Patient reports pain as a burning sensation. Current pain regimen:   Medication  Home med? Amount used yesterday    Acetaminophen 1g PO TID No 2g (2 doses)   Gabapentin 100 mg PO TID Yes 2 doses   Hydromorphone 0.25-0.5 mg IV q3h PRN pain No  0.5 mg IV (1 dose)   Ketorolac 15 mg IV q6h x 2 doses No Just started 9/30 AM   Methocarbamol 500 mg PO 4x daily No  3 doses   Oxycodone 10-15 mg PO q4h PRN pain No 30 mg (15 mg x 2 doses)     Morphine Equivalent Daily Dose (MEDD):  Date MEDD (mg)   9/29 55 mg         Bowel Regimen:  Sennosides-docusate 8.6-50 mg 1 tab BID  Milk of magnesia 30 mL daily PRN constipation - none given    Last BM: none this admission     VITALS:  /77   Pulse 80   Temp 98.6 °F (37 °C) (Oral)   Resp 20   Ht 4' 11\" (1.499 m)   Wt 128 lb (58.1 kg)   SpO2 93%   BMI 25.85 kg/m²     PROPHYLAXIS:  Stress ulcer: pantoprazole  VTE:  Apixaban 2.5 mg BID    ASSESSMENT/PLAN:  1)  Pain Management:   · Patient reports pain is controlled this AM (5/10) after doses of oxycodone and ketorolac this AM. Agree with addition of ketorolac. · Patient was taking gabapentin 300 mg daily at home - currently ordered 100 mg TID, which is same daily dose. · Would recommend to continue pain regimen and monitor. · Will continue to monitor and provide recommendations as appropriate.     Please call with any

## 2020-09-30 NOTE — PROGRESS NOTES
Physical Therapy    Facility/Department: [facility] 5T ORTHO/NEURO  Initial Assessment/Treatment Note    NAME: Sunita Stoner  : 1945  MRN: [id number]    Date of Service: 2020    Discharge Recommendations:  Sunita Stoner scored a 15/24 on the AM-PAC short mobility form. Current research shows that an AM-PAC score of 17 or less is typically not associated with a discharge to the patient's home setting. Based on the patient's AM-PAC score and their current functional mobility deficits, it is recommended that the patient have 3-5 sessions per week of Physical Therapy at d/c to increase the patient's independence. Please see assessment section for further patient specific details. If patient discharges prior to next session this note will serve as a discharge summary. Please see below for the latest assessment towards goals. PT Equipment Recommendations  Equipment Needed: Yes  Mobility Devices: Cindra Nepali: Rolling  Other: if d/c home    Assessment   Body structures, Functions, Activity limitations: Decreased functional mobility ; Increased pain;Decreased balance;Decreased ROM; Decreased strength;Decreased endurance  Assessment: Pt is a 70 y.o. female s/p R total knee revision presenting with decreased functional mobility. Pt is currently requiring physical assist for all functional mobility, which is a decline from reported baseline. Pt will benefit from continued therapy to maximize safety and independence. Currently pt unsafe to d/c home due to being unable to ambulate household distances and ascend/descend stairs to enter home and access bed/bathroom. If pt not agreeable to SNF, will require AT MINIMUM 24 hr supervision/assist with use of RW and HHPT for increased safety.   Treatment Diagnosis: decreased functional mobility s/p R total knee revision  Prognosis: Good  Decision Making: Medium Complexity  Patient Education: Role of PT, goals, d/c recommendations, pt verbalized understanding  REQUIRES PT FOLLOW UP: Yes  Activity Tolerance  Activity Tolerance: Patient limited by pain       Patient Diagnosis(es): The primary encounter diagnosis was Mechanical loosening of internal right knee prosthetic joint, sequela. A diagnosis of Mechanical loosening of internal right knee prosthetic joint, initial encounter [facility]) was also pertinent to this visit. has a past medical history of Anesthesia, Arthritis, Cerebral artery occlusion with cerebral infarction ([address] Gastric ulcer, History of esophagogastroduodenoscopy, Hyperlipidemia, Hypertension, Kidney stone, and Osteoporosis. has a past surgical history that includes Hysterectomy; joint replacement (Bilateral); Cholecystectomy; Lithotripsy; Dilation and curettage of uterus; Gastric bypass surgery; Foot surgery; other surgical history (Left, 07/11/2017); Nose surgery; other surgical history (Right, 10/24/2017); Breast surgery; and Revision total knee arthroplasty (Right, 9/29/2020). Restrictions  Restrictions/Precautions  Restrictions/Precautions: Weight Bearing  Lower Extremity Weight Bearing Restrictions  Right Lower Extremity Weight Bearing: Weight Bearing As Tolerated  Vision/Hearing  Vision: Impaired  Vision Exceptions: Wears glasses for reading  Hearing: Within functional limits     Subjective  General  Chart Reviewed: Yes  Patient assessed for rehabilitation services?: Yes  Additional Pertinent Hx: Pt is a 70 y.o. female admitted to [facility] on 9/29 s/p R total knee revision. PMH: HTN, hyperlipidemia, cerebral artery occlusion, arthritis. Family / Caregiver Present: Yes(Daughter)  Referring Practitioner: Mark Pearce MD  Diagnosis: mechanical loosening on internal R knee prosthetic joint  Follows Commands: Within Functional Limits  General Comment  Comments: Pt found seated in recliner upon PT arrival.  Pt agreeable to therapy session. Subjective  Subjective: \"This happens every time.   My leg hurts so bad for the first fews Assistance(from recliner to RW, cues for hand placement, increased time to transition RUE from recliner to RW, increased time to achieve near full stand)  Stand to sit: Minimal Assistance(verbal cues for hand placement, pt extending RLE forward prior to sitting due to decreased flexion ROM)  Ambulation  Ambulation?: Yes  WB Status: FWBAT  Ambulation 1  Surface: level tile  Device: Rolling Walker  Assistance: Minimal assistance  Quality of Gait: decreased kathia, forward trunk flexion, heavy reliance of BUE support on RW, decreased stance time R, decreased step length L, antalgia RLE  Distance: 2 steps forward, 1 step back  Comments: Pt unable to tolerate farther distance this session due to reported pain. Cues for upright posture. Stairs/Curb  Stairs?: No(unsafe to attempt)     Balance  Comments: CGA to maintain standing balance with BUE support on RW. Pt with difficulty maintaining upright posture due to pain. 2nd Session: Pt found seated in recliner upon PT arrival.  Pt was agreeable to therapy session and stated, \"Maybe I did better this time because I can bend my knee more. \"  Pt reported 6/10 R knee pain. Active R knee extension 10 degrees. Active R knee flexion 65 degrees. Sit to/from stand with Celso from recliner to RW, verbal cues for hand placement, pt extending RLE forward when sitting due to difficulty flexing knee. Pt ambulated 45' on level tile with RW and CGA (see above for quality of gait). Distance limited by reported pain and fatigue. PT asking pt if she is willing to attempt stairs this session. Pt responding, \"No, I don't want to do the stairs today. \"  PT educating pt on importance of attempting stairs to assess if pt is able to d/c home. Pt verbalized understanding, but continued to refuse to attempt. PT intending to have pt perform 2x10 reps seated therex for LE strengthening. Pt able to perform 2x10 reps ankle pumps and glute squeezes.   However pt only able to perform x3 reps of RLE seated marches and LAQs, stating that she was unable to perform more reps due to reported pain and fatigue. Pt stating, \"I feel like I've done a week's worth of therapy today. \"  PT educating pt on importance of performing seated therex to improve ROM and strength. Pt verbalized understanding, but continued to decline further reps due to reported pain. Pt left seated in recliner at end of session with chair alarm set and call light/needs within reach. Plan   Plan  Times per week: 7  Times per day: Twice a day  Current Treatment Recommendations: Strengthening, Neuromuscular Re-education, Home Exercise Program, ROM, Safety Education & Training, Balance Training, Endurance Training, Patient/Caregiver Education & Training, Functional Mobility Training, Equipment Evaluation, Education, & procurement, Transfer Training, Gait Training, Stair training  Safety Devices  Type of devices: All fall risk precautions in place, Call light within reach, Chair alarm in place, Gait belt, Left in chair, Nurse notified    G-Code       OutComes Score                                                  AM-PAC Score  AM-PAC Inpatient Mobility Raw Score : 15 (09/30/20 1235)  AM-PAC Inpatient T-Scale Score : 39.45 (09/30/20 1235)  Mobility Inpatient CMS 0-100% Score: 57.7 (09/30/20 1235)  Mobility Inpatient CMS G-Code Modifier : CK (09/30/20 1235)          Goals  Short term goals  Time Frame for Short term goals: Discharge  Short term goal 1: Pt will perform all bed mobility with CGA  Short term goal 2: Pt will perform supine to/from sit with Celso  Short term goal 3: Pt will perform sit to/from stand with RW and CGA  Short term goal 4: Pt will ambulate 48' with RW and CGA  Short term goal 5: Pt will ascend/descend 2 stairs with LRAD and Celso to enter home  Short term goal 6: Pt will ascend/descend 6 stairs with unilateral HR and Celso to access bed/bathroom  Patient Goals   Patient goals : \"To feel better. \"       Therapy Time Individual Concurrent Group Co-treatment   Time In 2377         Time Out 1220         Minutes 26            Second Session Therapy Time:   Individual Concurrent Group Co-treatment   Time In 1415         Time Out 1439         Minutes 24           Timed Code Treatment Minutes:  26 + 24    Total Treatment Minutes:  Danielle 49, PT    This note to serve as discharge summary if patient discharged before next session.

## 2020-09-30 NOTE — PROGRESS NOTES
Occupational Therapy   Occupational Therapy Initial Assessment and Treatment  Date: 2020   Patient Name: Mo Valdez  MRN: [id number]     : 1945    Date of Service: 2020    Discharge Recommendations:    Mo Valdez scored a 15/24 on the AM-PAC ADL Inpatient form. Current research shows that an AM-PAC score of 17 or less is typically not associated with a discharge to the patient's home setting. Based on the patient's AM-PAC score and their current ADL deficits, it is recommended that the patient have 3-5 sessions per week of Occupational Therapy at d/c to increase the patient's independence. Please see assessment section for further patient specific details. If patient discharges prior to next session this note will serve as a discharge summary. Please see below for the latest assessment towards goals. OT Equipment Recommendations  Equipment Needed: Yes  Other: Rec raised toilet seat due to need for assist with toilet t/f despite use of grab bar and tub t/f bench as pt is at risk of falling in shower environment. Assessment   Performance deficits / Impairments: Decreased functional mobility ; Decreased ADL status; Decreased strength;Decreased balance;Decreased endurance;Decreased high-level IADLs;Decreased safe awareness;Decreased cognition  After evaluation and treatment, pt found to be presenting with the above mentioned deficits. Pt would benefit from continued skilled occupational therapy to address these deficits, increasing safety and independence with ADL and functional mobility. She reports she is normally I or mod I with ADL and functional mobility/transfers, but now requires min A for t/fs and CGA with RW for functional mobility. She would benefit from cont inpatient OT at d/c, but if pt refuses, rec initial 24hr, HHOT, tub t/f bench, and raised toilet seat. Will continue to assess for discharge needs.      Treatment Diagnosis: decreased ability to perform ADL 2/2 R TKA revision  Prognosis: Good  Decision Making: Medium Complexity  REQUIRES OT FOLLOW UP: Yes  Activity Tolerance  Activity Tolerance: Patient Tolerated treatment well  Activity Tolerance: Supine at rest: 114/77, 74bpm, 96% on RA  Safety Devices  Safety Devices in place: Yes  Type of devices: Call light within reach; Chair alarm in place; Left in chair;Nurse notified;Gait belt         Patient Diagnosis(es): The primary encounter diagnosis was Mechanical loosening of internal right knee prosthetic joint, sequela. A diagnosis of Mechanical loosening of internal right knee prosthetic joint, initial encounter [facility]) was also pertinent to this visit. has a past medical history of Anesthesia, Arthritis, Cerebral artery occlusion with cerebral infarction ([address] Gastric ulcer, History of esophagogastroduodenoscopy, Hyperlipidemia, Hypertension, Kidney stone, and Osteoporosis. has a past surgical history that includes Hysterectomy; joint replacement (Bilateral); Cholecystectomy; Lithotripsy; Dilation and curettage of uterus; Gastric bypass surgery; Foot surgery; other surgical history (Left, 07/11/2017); Nose surgery; other surgical history (Right, 10/24/2017); Breast surgery; and Revision total knee arthroplasty (Right, 9/29/2020). Treatment Diagnosis: decreased ability to perform ADL 2/2 R TKA revision      Restrictions  Restrictions/Precautions  Restrictions/Precautions: Weight Bearing  Lower Extremity Weight Bearing Restrictions  Right Lower Extremity Weight Bearing: Weight Bearing As Tolerated    Subjective   General  Chart Reviewed: Yes  Patient assessed for rehabilitation services?: Yes  Family / Caregiver Present: Yes(daughter)  Referring Practitioner: Akash Torres MD  Diagnosis: Pt is s/p R TKA revision on 9/29/20  Subjective  Subjective: RN cleared pt for tx. Pt supine at session start.      Patient Currently in Pain: Yes(Pt reported 8/10 R knee/thigh pain, pt stating that she would like to wait to ask for pain medicine until after session)    Social/Functional History  Social/Functional History  Lives With: Daughter(has 24hr A at d/c)  Type of Home: House  Home Layout: Two level, Bed/Bath upstairs, 1/2 bath on main level(6-7 with RHR + 6-7 with HR)  Home Access: Stairs to enter without rails  Entrance Stairs - Number of Steps: [place] has 2 ANITHA  Bathroom Shower/Tub: Tub/Shower unit  Bathroom Toilet: Standard  Bathroom Equipment: (none)  Bathroom Accessibility: Walker accessible  Home Equipment: [employer]  ADL Assistance: Independent  Homemaking Assistance: Independent  Ambulation Assistance: Independent(occasionally uses cane)  Transfer Assistance: Independent  Active : Yes  Leisure & Hobbies: swimming  Additional Comments: Pt states that she recieved HHPT, [address] [place] [address], and nursing about 3 years ago.        Objective   Vision: Impaired  Vision Exceptions: Wears glasses for reading  Hearing: Within functional limits      Orientation  Overall Orientation Status: Within Functional Limits     Observation/Palpation  Observation: significant swelling to RLE, no drainage from dressing  Balance  Sitting Balance: Stand by assistance  Standing Balance: Contact guard assistance(to SBA with RW)  Functional Mobility  Functional - Mobility Device: Rolling Walker  Activity: To/from bathroom  Assist Level: Contact guard assistance(with increased time)  Toilet Transfers  Toilet - Technique: Ambulating(with RW)  Equipment Used: Standard toilet(with L grab bar)  Toilet Transfer: Minimal assistance     ADL  Feeding: Supervision(seated level)  Grooming: Contact guard assistance(to SBA to apply make-up standing with RW)  LE Dressing: Stand by assistance(to don B socks in supine; refusing pants/underwear)  Toileting: (Pt refused)     Tone RUE  RUE Tone: Normotonic  Tone LUE  LUE Tone: Normotonic  Coordination  Movements Are Fluid And Coordinated: Yes        Bed mobility  Supine to Sit: Stand by assistance(HOB raised, use of bed rail, and strap to lift RLE after education; to L)  Scooting: Stand by assistance(to EOB)     Transfers  Sit to stand: Contact guard assistance  Stand to sit: Contact guard assistance        Cognition  Overall Cognitive Status: Exceptions  Arousal/Alertness: Appropriate responses to stimuli  Following Commands: Follows one step commands consistently  Attention Span: Appears intact  Safety Judgement: Decreased awareness of need for assistance;Decreased awareness of need for safety  Problem Solving: Assistance required to generate solutions;Assistance required to implement solutions;Assistance required to correct errors made;Assistance required to identify errors made;Decreased awareness of errors  Insights: Decreased awareness of deficits  Initiation: Requires cues for some  Sequencing: Requires cues for some           Sensation  Overall Sensation Status: WFL(intact to light touch over all BLE dermatomes, pt denies numbness/tingling)                       BUE strength and AROM are [facility] based on functional presentation. Education: Role of OT, safe t/f training, safe use of DME, awareness of deficits, discharge planning, ADL as therapeutic exercise, importance of OOB, WB status, DME recs        Plan   Plan  Times per week: 5-7    AM-PAC Score        AM-[facility] Inpatient Daily Activity Raw Score: 15 (09/30/20 1346)  AM-PAC Inpatient ADL T-Scale Score : 34.69 (09/30/20 1346)  ADL Inpatient CMS 0-100% Score: 56.46 (09/30/20 1346)  ADL Inpatient CMS G-Code Modifier : CK (09/30/20 1346)    Goals  Short term goals  Time Frame for Short term goals: 1 week  Short term goal 1: Toilet t/f with SBA  Short term goal 2: Toileting with SBA  Short term goal 3: Don/doff pants/underwear with SBA  Short term goal 4: Bed mobility with mod I with AE  Patient Goals   Patient goals : \"Be independent. \"       Therapy Time   Individual Concurrent Group Co-treatment   Time In 3312         Time Out 1153         Minutes 42         Timed Code Treatment Minutes: 27 Minutes       If patient is discharged prior to next treatment session, this note will serve as the discharge summary.   Aron Mccann, OTR/L #[id number]

## 2020-09-30 NOTE — OP NOTE
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 POSTOPERATIVE DIAGNOSES:  1. 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 2.  Well-fixed tibial component. 3.  Instability, right total knee arthroplasty. SURGEON:  Major Arias MD    FIRST SURGICAL ASSISTANT:  Noa Valenzuela, physician assistant. The  physician assistant was necessary for the operation today for  manipulation of the extremity and application of the implants. This  help was otherwise not available in the operating room today. ANESTHESIA:  Epidural.    ESTIMATED BLOOD LOSS:  75 mL. INTRAVENOUS FLUIDS:  1200 mL of crystalloid. TOURNIQUET TIME:  45 minutes at 320 mmHg. IMPLANTS:  1.  DePuy TC3 right femur size 2.5 with 8-mm posterior augments medially  and laterally. A 12-mm distal lateral augment and 16-mm distal medial  augment with +2, -2 bold and 5-degree adaptor. 2.  Size 2.5 mm x 17.5 mm thickness TC3 rotating platform polyethelene  liner (12.5 mm thickness removed). SPECIMENS:  Synovial fluid sent for cell count and differential as well  as three samples sent for routine cultures. OPERATIVE INDICATIONS:  This is a 79-year-old female, who I revised  remotely for aseptic loosening. She had bilateral aseptic loose total  knee arthroplasties that I performed, well functioning TC3 from DePuy  constructs.   She did well initially after both of her revisions and she  was doing well for greater than a year; however, presented to my clinic  with increasing pain. I worked her up and ruled out infection as the  possibility of her pain. She had a TC3 implant and did not feel that  instability was the cause. I did take serial radiographs and I noted  that there was concern for subsidence of her femoral component. Her  tibial component appeared to be well-fixed; however, on comparison  radiographs, I felt that her femoral component had moved. Therefore, I  explained to the patient that she likely had a loose femoral component  and if she continued to have pain and dysfunction, she would require  revision surgery. We did take serial radiographs because she continued  to have pain. I did offer her surgery. We discussed the femoral only  revision and we discussed the standard risk of surgery including but not  limited to scar, pain, bleeding, infection, need for further surgery,  damage to surrounding neurovascular structures, infection, or even loss  of life or limb. We specifically discussed if the stem and sleeve were  loose that I would do a total loose cemented construct to avoid bony  ingrowth, on-growth apposition. She indicated understanding of our  conversation and elected to proceed. OPERATIVE DETAILS:  The patient was greeted in the preop holding area. Right knee was marked with a marker. She was brought to the operating  room where anesthesia was obtained. She was placed in the supine  position with all bony prominences well padded and tourniquet applied to  the right thigh. The right lower extremity was then prepped and draped  in normal standard sterile surgical fashion followed by a final  time-out, verifying correct patient, operative site, and operative plan. She did receive preoperative antibiotics and tranexamic acid prior to  surgical incision. The extremity was exsanguinated with an Ace bandage  and tourniquet was inflated to 320 mmHg.   I began by utilizing a  previous anterior based incision and developed full-thickness layers  medially and laterally. I then aspirated 5-mm of normal-appearing  synovial fluid from the intraarticular space, which was sent for stat  cell count and differential, which was negative signs of infection. I  then performed a medial parapatellar arthrotomy followed by synovectomy  at the medial and lateral gutters, elevating the infrapatellar fat pad  in order to get appropriate exposure. I removed the polyethelene liner,  addressed the patella. She had some bony overgrowth laterally, which I  did resect. I performed a synovectomy, retropatellar by the quad tendon  and patellar tendon. I then turned my attention to the femur and  obtained exposure of the femur. The femur appeared to have osteolysis  and a bony defect from around the femoral implant. I placed the explant  osteotome and then with a retro , impacted it multiple times with  a mallet and the femoral component came off the Izzy Jazmin taper leaving  behind a well-fixed sleeve in the femoral metaphysis. I evaluated the  sleeve closely. The interface was well ingrown. At this point, I made  the decision that removing her sleeve would likely expose her to  significant bone loss requiring a distal femoral tumor oncologic type  prosthesis because she had a well grown sleeve. I decided to mate a new  femoral implant and distalize as much as possible and cement this on to  the end of the femur. I removed all of the tissue debris from the end  of the femur. I once again obtained exposure of the entire tibia, I  checked the tibia. She did have some medial bone defect, but her sleeve  appeared to be well-fixed, evaluated this significantly and did not feel  that she needed a tibia revision.   I did build my construct on the back  table, placed cement on the back of the implant, some cement on the  femur, impacted it onto a Jolley taper and impact cement around the femur  to have an excellent cement fixation as well as a well-fixed sleeve. I  then irrigated the wound copiously, trialed multiple liners, selected  the 17.5 mm thickness, which came into full extension as well as flexed  to beyond 120 degrees, I was pleased with the construct, irrigated the  wound with Betadine, injected the ortho cocktail mixture and deflated  the tourniquet at 45 minutes. The patient was then closed with a  combination of #1 Ethibond and #1 Vicryl in the arthrotomy followed by  running Stratafix. A 2-0 Vicryl was placed in the subcutaneous and  subdermal tissue followed by running 4-0 Monocryl suture, Dermabond,  Prineo, and then a sterile silver-impregnated dressing. The patient was  wrapped with an ACE bandage. She was awakened and taken to the  postoperative anesthesia care in stable condition. All sponge and  needle counts were correct x2.         Michi Rowland MD    D: 09/29/2020 15:15:42       T: 09/29/2020 18:43:46     [email or web address]  Job#: [id number]     Doc#: [id number]    CC:

## 2020-10-01 VITALS
RESPIRATION RATE: 16 BRPM | HEIGHT: 59 IN | WEIGHT: 128 LBS | OXYGEN SATURATION: 96 % | SYSTOLIC BLOOD PRESSURE: 107 MMHG | HEART RATE: 70 BPM | TEMPERATURE: 97.4 F | BODY MASS INDEX: 25.8 KG/M2 | DIASTOLIC BLOOD PRESSURE: 70 MMHG

## 2020-10-01 PROBLEM — I95.9 HYPOTENSION: Status: ACTIVE | Noted: 2020-10-01

## 2020-10-01 PROCEDURE — 0SPC09Z REMOVAL OF LINER FROM RIGHT KNEE JOINT, OPEN APPROACH: ICD-10-PCS | Performed by: ORTHOPAEDIC SURGERY

## 2020-10-01 PROCEDURE — 97116 GAIT TRAINING THERAPY: CPT

## 2020-10-01 PROCEDURE — 6370000000 HC RX 637 (ALT 250 FOR IP): Performed by: ORTHOPAEDIC SURGERY

## 2020-10-01 PROCEDURE — 96376 TX/PRO/DX INJ SAME DRUG ADON: CPT

## 2020-10-01 PROCEDURE — 6360000002 HC RX W HCPCS: Performed by: PHYSICIAN ASSISTANT

## 2020-10-01 PROCEDURE — 1200000000 HC SEMI PRIVATE

## 2020-10-01 PROCEDURE — 97530 THERAPEUTIC ACTIVITIES: CPT

## 2020-10-01 PROCEDURE — 6370000000 HC RX 637 (ALT 250 FOR IP): Performed by: PHYSICIAN ASSISTANT

## 2020-10-01 PROCEDURE — 2580000003 HC RX 258: Performed by: ORTHOPAEDIC SURGERY

## 2020-10-01 PROCEDURE — 0SUC09C SUPPLEMENT RIGHT KNEE JOINT WITH LINER, PATELLAR SURFACE, OPEN APPROACH: ICD-10-PCS | Performed by: ORTHOPAEDIC SURGERY

## 2020-10-01 RX ORDER — KETOROLAC TROMETHAMINE 30 MG/ML
15 INJECTION, SOLUTION INTRAMUSCULAR; INTRAVENOUS EVERY 6 HOURS
Status: COMPLETED | OUTPATIENT
Start: 2020-10-01 | End: 2020-10-01

## 2020-10-01 RX ADMIN — CITALOPRAM HYDROBROMIDE 40 MG: 20 TABLET ORAL at 08:41

## 2020-10-01 RX ADMIN — Medication 10 ML: at 08:43

## 2020-10-01 RX ADMIN — GABAPENTIN 100 MG: 100 CAPSULE ORAL at 08:40

## 2020-10-01 RX ADMIN — OXYCODONE 10 MG: 5 TABLET ORAL at 06:34

## 2020-10-01 RX ADMIN — LORAZEPAM 0.5 MG: 0.5 TABLET ORAL at 00:14

## 2020-10-01 RX ADMIN — DOCUSATE SODIUM 50 MG AND SENNOSIDES 8.6 MG 1 TABLET: 8.6; 5 TABLET, FILM COATED ORAL at 08:41

## 2020-10-01 RX ADMIN — APIXABAN 2.5 MG: 2.5 TABLET, FILM COATED ORAL at 08:41

## 2020-10-01 RX ADMIN — KETOROLAC TROMETHAMINE 15 MG: 30 INJECTION, SOLUTION INTRAMUSCULAR at 13:53

## 2020-10-01 RX ADMIN — METHOCARBAMOL TABLETS 500 MG: 500 TABLET, COATED ORAL at 13:48

## 2020-10-01 RX ADMIN — OXYCODONE 15 MG: 5 TABLET ORAL at 02:45

## 2020-10-01 RX ADMIN — Medication 1000 UNITS: at 08:41

## 2020-10-01 RX ADMIN — ACETAMINOPHEN 1000 MG: 500 TABLET ORAL at 13:53

## 2020-10-01 RX ADMIN — NALOXEGOL OXALATE 12.5 MG: 12.5 TABLET, FILM COATED ORAL at 08:41

## 2020-10-01 RX ADMIN — GABAPENTIN 100 MG: 100 CAPSULE ORAL at 13:53

## 2020-10-01 RX ADMIN — KETOROLAC TROMETHAMINE 15 MG: 30 INJECTION, SOLUTION INTRAMUSCULAR at 09:55

## 2020-10-01 RX ADMIN — ACETAMINOPHEN 1000 MG: 500 TABLET ORAL at 06:33

## 2020-10-01 RX ADMIN — ACETAMINOPHEN 1000 MG: 500 TABLET ORAL at 00:15

## 2020-10-01 RX ADMIN — METHOCARBAMOL TABLETS 500 MG: 500 TABLET, COATED ORAL at 00:15

## 2020-10-01 RX ADMIN — PANTOPRAZOLE SODIUM 40 MG: 40 TABLET, DELAYED RELEASE ORAL at 08:41

## 2020-10-01 RX ADMIN — METHOCARBAMOL TABLETS 500 MG: 500 TABLET, COATED ORAL at 08:41

## 2020-10-01 RX ADMIN — OXYCODONE 15 MG: 5 TABLET ORAL at 11:42

## 2020-10-01 ASSESSMENT — PAIN DESCRIPTION - LOCATION
LOCATION: KNEE

## 2020-10-01 ASSESSMENT — PAIN DESCRIPTION - PROGRESSION
CLINICAL_PROGRESSION: GRADUALLY WORSENING
CLINICAL_PROGRESSION: GRADUALLY WORSENING
CLINICAL_PROGRESSION: GRADUALLY IMPROVING

## 2020-10-01 ASSESSMENT — PAIN SCALES - GENERAL
PAINLEVEL_OUTOF10: 5
PAINLEVEL_OUTOF10: 4
PAINLEVEL_OUTOF10: 0
PAINLEVEL_OUTOF10: 7
PAINLEVEL_OUTOF10: 3
PAINLEVEL_OUTOF10: 9
PAINLEVEL_OUTOF10: 7
PAINLEVEL_OUTOF10: 5

## 2020-10-01 ASSESSMENT — PAIN DESCRIPTION - DESCRIPTORS
DESCRIPTORS: ACHING
DESCRIPTORS: NAGGING;SHARP;SHOOTING
DESCRIPTORS: ACHING

## 2020-10-01 ASSESSMENT — PAIN - FUNCTIONAL ASSESSMENT
PAIN_FUNCTIONAL_ASSESSMENT: PREVENTS OR INTERFERES SOME ACTIVE ACTIVITIES AND ADLS
PAIN_FUNCTIONAL_ASSESSMENT: PREVENTS OR INTERFERES SOME ACTIVE ACTIVITIES AND ADLS

## 2020-10-01 ASSESSMENT — PAIN DESCRIPTION - FREQUENCY
FREQUENCY: CONTINUOUS
FREQUENCY: INTERMITTENT
FREQUENCY: INTERMITTENT

## 2020-10-01 ASSESSMENT — PAIN DESCRIPTION - ORIENTATION
ORIENTATION: RIGHT
ORIENTATION: RIGHT

## 2020-10-01 ASSESSMENT — PAIN DESCRIPTION - ONSET
ONSET: GRADUAL
ONSET: ON-GOING
ONSET: ON-GOING

## 2020-10-01 ASSESSMENT — PAIN DESCRIPTION - PAIN TYPE
TYPE: SURGICAL PAIN
TYPE: CHRONIC PAIN;ACUTE PAIN
TYPE: SURGICAL PAIN

## 2020-10-01 NOTE — PROGRESS NOTES
Patient has done with therapy this morning will dc home this afternoon. DC order placed. Pain medication Rx on chart.

## 2020-10-01 NOTE — PROGRESS NOTES
Physical Therapy  Facility/Department: [facility] [address] 5T ORTHO/NEURO  Daily Treatment Note  NAME: Bridgette Chambers  : 1945  MRN: [id number]    Date of Service: 10/1/2020    Discharge Recommendations:  Bridgette Chambers scored a 18/24 on the AM-PAC short mobility form. Current research shows that an AM-PAC score of 18 or greater is typically associated with a discharge to the patient's home setting. Based on the patient's AM-PAC score and their current functional mobility deficits, it is recommended that the patient have 2-3 sessions per week of Physical Therapy at d/c to increase the patient's independence. At this time, this patient demonstrates the endurance and safety to discharge home with home services and a follow up treatment frequency of 2-3x/wk. Please see assessment section for further patient specific details. HOME HEALTH CARE: LEVEL 1 STANDARD    - Initial home health evaluation to occur within 24-48 hours, in patient home   - Therapy to evaluate with goal of regaining prior level of functioning   - Therapy to evaluate if patient has [address] [facility] needs for personal care    If patient discharges prior to next session this note will serve as a discharge summary. Please see below for the latest assessment towards goals. PT Equipment Recommendations  Equipment Needed: No(RW provided to pt already)    Assessment   Body structures, Functions, Activity limitations: Decreased functional mobility ; Increased pain;Decreased balance;Decreased ROM; Decreased strength;Decreased endurance  Assessment: Pt progressing well towards goals. Pt completing stair negotiation safely with SBA and ambulating increased distances with steady gait pattern. Pt continues to be limited by overall R knee ROM, strength and endurance. Pt plans to d/c home with assist from daughter and spouse. Recommend continued skilled therapy to address these deficits towards functional independence.   Treatment Diagnosis: decreased functional mobility s/p R total knee revision  Prognosis: Good  Decision Making: Medium Complexity  PT Education: Goals; General Safety;Gait Training;Functional Mobility Training;Transfer Training  Patient Education: Pt verbalized understanding. REQUIRES PT FOLLOW UP: Yes  Activity Tolerance  Activity Tolerance: Patient Tolerated treatment well     Patient Diagnosis(es): The primary encounter diagnosis was Mechanical loosening of internal right knee prosthetic joint, sequela. A diagnosis of Mechanical loosening of internal right knee prosthetic joint, initial encounter [facility]) was also pertinent to this visit. has a past medical history of Anesthesia, Arthritis, Cerebral artery occlusion with cerebral infarction ([address] Gastric ulcer, History of esophagogastroduodenoscopy, Hyperlipidemia, Hypertension, Kidney stone, and Osteoporosis. has a past surgical history that includes Hysterectomy; joint replacement (Bilateral); Cholecystectomy; Lithotripsy; Dilation and curettage of uterus; Gastric bypass surgery; Foot surgery; other surgical history (Left, 07/11/2017); Nose surgery; other surgical history (Right, 10/24/2017); Breast surgery; and Revision total knee arthroplasty (Right, 9/29/2020). Restrictions  Restrictions/Precautions  Restrictions/Precautions: Weight Bearing  Lower Extremity Weight Bearing Restrictions  Right Lower Extremity Weight Bearing: Weight Bearing As Tolerated  Subjective   General  Chart Reviewed: Yes  Additional Pertinent Hx: Pt is a 70 y.o. female admitted to [place] [address] on 9/29 s/p R total knee revision. PMH: HTN, hyperlipidemia, cerebral artery occlusion, arthritis. Family / Caregiver Present: Yes(daughter)  Referring Practitioner: Vargas Britton MD  Subjective  Subjective: \"I'm ready to do the stairs. \"  General Comment  Comments: Pt supine in bed upon arrival. Pt agreeable to therapy. Pain Screening  Patient Currently in Pain: Yes(no rating given.  RN aware)  Vital Signs  Patient Currently in Pain: Yes(no rating given. RN aware)       Orientation  Orientation  Overall Orientation Status: Within Functional Limits  Cognition      Objective   Bed mobility  Supine to Sit: Minimal assistance(for unweighting RLE to EOB)  Sit to Supine: Minimal assistance(to lift RLE into bed)  Scooting: Stand by assistance  Transfers  Sit to Stand: Stand by assistance(from EOB. VC's for hand placement.)  Stand to sit: Stand by assistance  Ambulation  Ambulation?: Yes  WB Status: FWBAT  Ambulation 1  Surface: level tile  Device: Rolling Walker  Assistance: Stand by assistance  Quality of Gait: decreased kathia, minimal knee flexion on R through swing phase, increased use of UE's on RW, decreased step length, step through pattern  Distance: ~200' (including flight of stairs)  Comments: Cues for increased R knee flexion and decreased use of UE's on RW. Stairs/Curb  Stairs?: Yes  Stairs  # Steps : 15  Stairs Height: 6\"  Rails: Right ascending  Device: No Device  Assistance: Stand by assistance     Balance  Posture: Good  Sitting - Static: Good  Sitting - Dynamic: Good  Standing - Static: Good(with RW)  Standing - Dynamic: Good;-(with RW)  Comments: No LOB. AROM RLE (degrees)  RLE General AROM: Knee: 5-76 degrees                    G-Code     OutComes Score                                                     AM-PAC Score  AM-PAC Inpatient Mobility Raw Score : 18 (10/01/20 1247)  AM-PAC Inpatient T-Scale Score : 43.63 (10/01/20 1247)  Mobility Inpatient CMS 0-100% Score: 46.58 (10/01/20 1247)  Mobility Inpatient CMS G-Code Modifier : CK (10/01/20 1247)          Goals- ALL goals met 10/1 except #1.    Short term goals  Time Frame for Short term goals: Discharge  Short term goal 1: Pt will perform all bed mobility with CGA  Short term goal 2: Pt will perform supine to/from sit with [place]  Short term goal 3: Pt will perform sit to/from stand with RW and CGA  Short term goal 4: Pt will ambulate 48' with RW and CGA  Short term goal 5: Pt will ascend/descend 2 stairs with LRAD and Celso to enter home  Short term goal 6: Pt will ascend/descend 6 stairs with unilateral HR and Celso to access bed/bathroom  Patient Goals   Patient goals : \"To feel better. \"    Plan    Plan  Times per week: 7  Times per day: Twice a day  Current Treatment Recommendations: Strengthening, Neuromuscular Re-education, Home Exercise Program, ROM, Safety Education & Training, Balance Training, Endurance Training, Patient/Caregiver Education & Training, Functional Mobility Training, Equipment Evaluation, Education, & procurement, Transfer Training, Gait Training, Stair training  Safety Devices  Type of devices: Bed alarm in place, Call light within reach, Nurse notified, Left in bed     Therapy Time   Individual Concurrent Group Co-treatment   Time In 1050         Time Out 1117         Minutes [address], PTA   Betty Ballesteros, [facility]

## 2020-10-01 NOTE — PROGRESS NOTES
Clinical Pharmacy Consult Note    Admit date: 9/29/2020     Subjective/Objective:  71 yo F with PMH of CVA, gastric ulcer, HTN, kidney stones; admitted s/p Revision of femoral component of TKA (9/29). Pharmacy has been consulted to make pain medication recommendations by JAIME Lagunas. Patient's stated pain goal: 0/10, but she states that she knows this is not realistic. 9/30: Patient states she has a rough night due to hypotensive overnight. She states pain is currently controlled this AM after doses of oxycodone and ketorolac this AM. Ketorolac 15 mg IV q6h x 2 doses ordered this AM. Patient reports pain as a burning sensation. 10/1: Nausea and emesis overnight - nursing encouraged patient to eat prior to pain medication. Pain scores 5-7 overnight. Current pain regimen:   Medication  Home med? Amount used yesterday    Acetaminophen 1g PO TID No 4g (4 doses)   Gabapentin 100 mg PO TID Yes 2 (refused 1 dose)   Hydromorphone 0.25-0.5 mg IV q3h PRN pain No  0.5 mg IV (1 dose)   Ketorolac 15 mg IV q6h x 2 doses No Received x 2 doses 9/30; ordered additional 2 doses today   Methocarbamol 500 mg PO 4x daily No  4 doses   Oxycodone 10-15 mg PO q4h PRN pain No 80 mg (6 doses)     Morphine Equivalent Daily Dose (MEDD):  Date MEDD (mg)   9/29 55 mg   9/30 130 mg     Bowel Regimen:  Sennosides-docusate 8.6-50 mg 1 tab BID  Milk of magnesia 30 mL daily PRN constipation - none given    Last BM: none this admission     VITALS:  BP 90/60   Pulse 77   Temp 98 °F (36.7 °C) (Oral)   Resp 16   Ht 4' 11\" (1.499 m)   Wt 128 lb (58.1 kg)   SpO2 94%   BMI 25.85 kg/m²     PROPHYLAXIS:  Stress ulcer: pantoprazole  VTE:  Apixaban 2.5 mg BID    ASSESSMENT/PLAN:  1)  Pain Management:   · Patient hypotensive again overnight. · Ketorolac 15 mg IV q6h x 2 doses ordered again overnight - agree with this. · Would recommend to continue pain regimen and monitor.   · Will continue to monitor and provide recommendations as appropriate. Please call with any questions.   Jimmy Cortez, PharmD, BCPS  Wireless: [phone]  or [phone]  10/1/2020 10:54 AM

## 2020-10-01 NOTE — CARE COORDINATION
Case Management Assessment            Discharge Note                    Date / Time of Note: 10/1/2020 1:09 PM                  Discharge Note Completed by: Pietro Maria    Patient Name: Byron Raymond   YOB: 1945  Diagnosis: Mechanical loosening of internal right knee prosthetic joint, initial encounter ([facility] [T84.032A]  Mechanical loosening of internal right knee prosthetic joint, initial encounter ([facility] [T84.032A]  Hypotension [I95.9]   Date / Time: 9/29/2020  9:30 AM    Current PCP: Yue Oconnell MD  Clinic patient: No    Hospitalization in the last 30 days: No    Advance Directives:  Code Status: Full Code  [place] DNR form completed and on chart: No    Financial:  Payor: Tracey Arteaga / Plan: [place] HMO / Product Type: *No Product type* /      Pharmacy:    [place], [address] [facility] [address] [facility] [place] Veronica Lutz. Kyra Marcusman [phone] - F [phone]  [facility] [address] [address]  Phone: [phone] Fax: [phone]      Assistance purchasing medications?: Potential Assistance Purchasing Medications: No  Assistance provided by Case Management: None at this time    Does patient want to participate in local refill/ meds to beds program?: Yes    Meds To Beds General Rules:  1. Can ONLY be done Monday- Friday between 8:30am-5pm  2. Prescription(s) must be in pharmacy by 3pm to be filled same day  3. Copy of patient's insurance/ prescription drug card and patient face sheet must be sent along with the prescription(s)  4. Cost of Rx cannot be added to hospital bill. If financial assistance is needed, please contact unit  or ;  or  CANNOT provide pharmacy voucher for patients co-pays  5.  Patients can then  the prescription on their way out of the hospital at discharge, or pharmacy can deliver to the bedside if staff is available. (payment due at time of pick-up or delivery - cash, check, or card accepted) Able to afford home medications/ co-pay costs: Yes    ADLS:  Current PT AM-PAC Score: 18 /24  Current OT AM-PAC Score: 16 /24      DISCHARGE Disposition: Home- No Services Needed    LOC at discharge: Not Applicable  LISE Completed: Not Indicated    Notification completed in HENS/PAS?:  Not Applicable    IMM Completed:   Not Indicated    Transportation:  Transportation PLAN for discharge: family   Mode of Transport: Private Car  Reason for medical transport: Not Applicable  Name of Transport Company: Not Applicable    Durable Medical Equipment:  DME Provider: Aminata Oakley obtained during hospitalization: walker        Additional CM Notes: Pt to DC home with RW.  to take home. The Plan for Transition of Care is related to the following treatment goals of Mechanical loosening of internal right knee prosthetic joint, initial encounter ([facility] [T84.032A]  Mechanical loosening of internal right knee prosthetic joint, initial encounter ([facility] [T84.032A]  Hypotension [I95.9]    The Patient and/or patient representative Agatha Canales and her family were provided with a choice of provider and agrees with the discharge plan Yes    Freedom of choice list was provided with basic dialogue that supports the patient's individualized plan of care/goals and shares the quality data associated with the providers.  Yes    Care Transitions patient: No    Libby Dale RN  [facility]  Case Management Department  Ph: [phone]  Fax: [phone]

## 2020-10-01 NOTE — DISCHARGE INSTR - COC
Continuity of Care Form    Patient Name: Sarah Godfrey   :  1945  MRN:  [id number]    Admit date:  2020  Discharge date:  ***    Code Status Order: Full Code   Advance Directives:   [facility] Documentation     Date/Time Healthcare Directive Type of Healthcare Directive Copy in [address] [facility] [address] Agent's Name Healthcare Agent's Phone Number    20 1574  Yes, patient has an advance directive for healthcare treatment  Living will;Health care treatment directive  No, copy requested from family  Adult Children  Mandeep Thomas  [phone]    20 1529  Other (Comment) NOT ADDRESSED AT THAT TIME  --  --  --  --  --          Admitting Physician:  Leanna Patton MD  PCP: Jessy Marc MD    Discharging Nurse: [facility]/Room#: 7188/2488-48  Discharging Unit Phone Number: ***    Emergency Contact:   Extended Emergency Contact Information  Primary Emergency Contact: Isabel Abdi, [address] [place] [address]  Home Phone: [phone]  Relation: Child  Secondary Emergency Contact: Penny [facility] Phone: [phone]  Relation: Child    Past Surgical History:  Past Surgical History:   Procedure Laterality Date    BREAST SURGERY      tumor removed    CHOLECYSTECTOMY      DILATION AND CURETTAGE OF UTERUS      FOOT SURGERY      GASTRIC BYPASS SURGERY      HYSTERECTOMY      JOINT REPLACEMENT Bilateral     knees    LITHOTRIPSY      NOSE SURGERY      OTHER SURGICAL HISTORY Left 2017     LEFT TOTAL KNEE ARTHROPLSTY REVISION      OTHER SURGICAL HISTORY Right 10/24/2017    REVISION RIGHT TOTAL KNEE ARTHROPLASTY    REVISION TOTAL KNEE ARTHROPLASTY Right 2020    RIGHT TOTAL KNEE ARTHROPLASTY REVISION performed by Leanna Patton MD at [address]       Immunization History:   Immunization History   Administered Date(s) Administered    Influenza, High Dose (Fluzone 65 yrs and older) 10/25/2017    Influenza, Quadv, IM, PF (6 mo and older Fluzone, Flulaval, Fluarix, and 3 yrs and older Afluria) 09/30/2020       Active Problems:  Patient Active Problem List   Diagnosis Code    Loosening of knee joint prosthesis ([facility] T84.038A, Z96.659    Mechanical loosening of internal right knee prosthetic joint ([facility] T84.032A    Stroke determined by clinical assessment ([facility] I63.9    TIA (transient ischemic attack) G45.9    Hypotension I95.9       Isolation/Infection:   Isolation          No Isolation        Patient Infection Status     None to display          Nurse Assessment:  Last Vital Signs: /70   Pulse 70   Temp 97.4 °F (36.3 °C) (Oral)   Resp 16   Ht 4' 11\" (1.499 m)   Wt 128 lb (58.1 kg)   SpO2 96%   BMI 25.85 kg/m²     Last documented pain score (0-10 scale): Pain Level: 4  Last Weight:   Wt Readings from Last 1 Encounters:   09/29/20 128 lb (58.1 kg)     Mental Status:  oriented    IV Access:  - None    Nursing Mobility/ADLs:  Walking   Assist with walker  Transfer  Assisted  Bathing  Independent  Dressing  Independent  [address] Delivery   whole    Wound Care Documentation and Therapy:        Elimination:  Continence:   · Bowel: Yes  · Bladder: Yes  Urinary Catheter: None   Colostomy/Ileostomy/Ileal Conduit: No       Date of Last BM: day of surgery    Intake/Output Summary (Last 24 hours) at 10/1/2020 1240  Last data filed at 9/30/2020 2135  Gross per 24 hour   Intake --   Output 700 ml   Net -700 ml     I/O last 3 completed shifts: In: 12 [P.O.:960]  Out: 1350 [Urine:1350]    Safety Concerns:      At Risk for Falls    Impairments/Disabilities:      None    Nutrition Therapy:  Current Nutrition Therapy:   - Oral Diet:  General    Routes of Feeding: Oral  Liquids: No Restrictions  Daily Fluid Restriction: no  Last Modified Barium Swallow with Video (Video Swallowing Test): not done    Treatments at the Time of Hospital Discharge:   Respiratory Treatments: ***  Oxygen

## 2020-10-01 NOTE — PROGRESS NOTES
Occupational Therapy  Facility/Department: [facility] 5T ORTHO/NEURO  Daily Treatment Note  NAME: Raphael Lauren  : 1945  MRN: [id number]    Date of Service: 10/1/2020    Discharge Recommendations:    Raphael Lauren scored a 16/24 on the AM-PAC ADL Inpatient form. Current research shows that an AM-PAC score of 17 or less is typically not associated with a discharge to the patient's home setting. Based on the patient's AM-PAC score and their current ADL deficits, it is recommended that the patient have 3-5 sessions per week of Occupational Therapy at d/c to increase the patient's independence. Please see assessment section for further patient specific details. If patient discharges prior to next session this note will serve as a discharge summary. Please see below for the latest assessment towards goals. HOME HEALTH CARE: LEVEL 1 STANDARD    - Initial home health evaluation to occur within 24-48 hours, in patient home   - Therapy to evaluate with goal of regaining prior level of functioning   - Therapy to evaluate if patient has [id number] needs for personal care    Assessment    Pt progressing this session but reluctant to complete ADL tasks. Bed mobility demonstrated with RW and CGA with slow painful gait. Bed mobility requiring CGA with increased time and effort. Pt would benefit from inpt OT however pt is planning on returning home and will need 24hr assist and HHOT. Continue OT per POC. Patient Diagnosis(es): The primary encounter diagnosis was Mechanical loosening of internal right knee prosthetic joint, sequela. A diagnosis of Mechanical loosening of internal right knee prosthetic joint, initial encounter [facility]) was also pertinent to this visit. has a past medical history of Anesthesia, Arthritis, Cerebral artery occlusion with cerebral infarction ([address] Gastric ulcer, History of esophagogastroduodenoscopy, Hyperlipidemia, Hypertension, Kidney stone, and Osteoporosis.    has a past surgical history that includes Hysterectomy; joint replacement (Bilateral); Cholecystectomy; Lithotripsy; Dilation and curettage of uterus; Gastric bypass surgery; Foot surgery; other surgical history (Left, 07/11/2017); Nose surgery; other surgical history (Right, 10/24/2017); Breast surgery; and Revision total knee arthroplasty (Right, 9/29/2020). Restrictions  Restrictions/Precautions  Restrictions/Precautions: Weight Bearing  Lower Extremity Weight Bearing Restrictions  Right Lower Extremity Weight Bearing: Weight Bearing As Tolerated       Additional Pertinent Hx: Pt is a 70 y.o. female admitted to [facility] on 9/29 s/p R total knee revision. PMH: HTN, hyperlipidemia, cerebral artery occlusion, arthritis. WB Status: FWBAT    Diagnosis: Pt is s/p R TKA revision on 9/29/20  Treatment Diagnosis: decreased ability to perform ADL 2/2 R TKA revision    Subjective:   Pt met in bed and agreeable to OT treatment with encouragement. Pain:   5/10 pain with movement, pt repositioned to comfort and RN awared    Objective:    Cognition/Orientation:  WFL    Bed mobility   Supine to sit: CGA with increased time and effort. Pt educated on use of gait belt for leg . Scooting: SBA for scooting to EOB and back in chair    Functional Mobility   Sit to Stand: CGA to Min A with increased time and effort  Stand to Sit: Min A with VCs for eccentric control  Bed to Chair Transfer:  CGA to Min  A with VCs for positioning   Commode Transfer: declined  Other: Functional mobility around end of bed to recliner chair with slow painful gait, RW and CGA    ADLs   Other: Pt declining ADL s at this time. Pt stating that she has the LE AE equipment and was familiar with tech. Pt shower on second floor and pt may need to sponge bath for a period of time prior to safety managing stairs.        Activity Tolerance:  Pt limited by pain    Patient Education:   Safe home set, LE dressing, need for initial sponge bathing due to stairs - pt verb understanding    Safety Devices in Place:  Pt left in chair with alarm on and call light in reach. Plan  If pt discharges prior to next treatment, this note will serve as discharge summary  Plan  Times per week: 5-7    AM-PAC Score        AM-PAC Inpatient Daily Activity Raw Score: 16 (10/01/20 0923)  AM-PAC Inpatient ADL T-Scale Score : 35.96 (10/01/20 0923)  ADL Inpatient CMS 0-100% Score: 53.32 (10/01/20 0923)  ADL Inpatient CMS G-Code Modifier : CK (10/01/20 0923)    Goals (as determined and assessed by primary OT)  Short term goals  Time Frame for Short term goals: 1 week  Short term goal 1: Toilet t/f with SBA - ongoing  Short term goal 2: Toileting with SBA - ongoing  Short term goal 3: Don/doff pants/underwear with SBA - ongoing  Short term goal 4: Bed mobility with mod I with AE - ongoing  Patient Goals   Patient goals : \"Be independent. \"       Therapy Time   Individual Concurrent Group Co-treatment   Time In 0809         Time Out 0832         Minutes 23         Timed Code Treatment Minutes: 23 Minutes     Total Treatment minutes: 23 min     [address] [place] [address], Ne, DONNELLY/L

## 2020-10-01 NOTE — PLAN OF CARE
Problem: Falls - Risk of:  Goal: Will remain free from falls  9/30/2020 2145 by Aleks Ortiz RN  Outcome: Ongoing   Pt remains free from injury during this shift. Pt is up with assistance x 1 with gait belt and walker. Encourage pt to call for all assistance. Call light is in reach, bed alarm is activated for safety, bed is in lowest position. Will continue to monitor. Problem: Pain:  Goal: Pain level will decrease  9/30/2020 2145 by Aleks Ortiz RN  Outcome: Ongoing   Medicated pt per orders, please see e-Mar. Encourage pt to call if pain increases. Will continue to monitor. Problem: Nausea/Vomiting:  Goal: Absence of nausea/vomiting  Outcome: Ongoing   Pt c/o nausea. Antiemetic given by prior nurse with no relief. Pt had small amount of emesis.  Pt encouraged to continue to drink without a straw preferably and eat something prior to

## 2020-10-02 ENCOUNTER — CARE COORDINATION (OUTPATIENT)
Dept: CASE MANAGEMENT | Age: 75
End: 2020-10-02

## 2020-10-04 NOTE — DISCHARGE SUMMARY
[address] [facility] SUMMARY    Pre Operative Diagnosis  Mechanical loosening of internal right knee prosthetic joint, initial encounter ([facility] [address] [T84.032A]    Post Operative Diagnosis  Mechanical loosening of internal right knee prosthetic joint, initial encounter [facility]) [V79.221G]    Discharge Diagnosis Mechanical loosening of internal right knee prosthetic joint, initial encounter ([facility] [address] [K42.979P]    Procedure Preformed  Procedure(s):  RIGHT TOTAL KNEE ARTHROPLASTY REVISION    Surgeon  Linda Parks MD     Medical course: as per medical records       The patient was taken to the operating room  where the aforementioned procedure was preformed. The patient was taken to the post operative anesthesia recovery unit in stable condition. The patient was then transferred to the orthopaedic floor for post operative pain management and convalesce. ( x )The patient was placed on anticoagulation therapy for DVT prophylaxis       The patient was discharged in stable condition. Please see medical reconciliation for discharge medications. The discharge instructions were explained to the patient and the family. The patient will follow up in the office in 3 weeks for repeat examination and xray .

## 2020-10-05 NOTE — CARE COORDINATION
[address] Transitions Initial Follow Up Call    Call within 2 business days of discharge: yes     Patient: Morgan Rivera Patient : 1945   MRN: [id number]   Reason for Admission: covid 19 monitoring   Discharge Date: 10/1/20 RARS: Readmission Risk Score: 6      Last Discharge [facility]       Complaint Diagnosis Description Type Department Provider    20  Mechanical loosening of internal right knee prosthetic joint, sequela . .. Admission (Discharged) [address] [place] Blackmon MD           Spoke with: [address] [place] [address] Street: [facility] [employer]      Non-face-to-face services provided:  Obtained and reviewed discharge summary and/or continuity of care documents  Education of patient/family/caregiver/guardian to support self-management-   Assessment and support for treatment adherence and medication management-      Care Transitions 24 Hour Call    Do you have any ongoing symptoms?:  Yes  Patient-reported symptoms:  Pain  Interventions for patient-reported symptoms:  Other  Do you have a copy of your discharge instructions?:  Yes  Do you have all of your prescriptions and are they filled?:  No  Have you been contacted by a [address]?:  No  Have you scheduled your follow up appointment?:  No  Were you discharged with any Home Care or Post Acute Services:  No  Do you feel like you have everything you need to keep you well at home?:  Yes  Care Transitions Interventions       Summary    Challenges to be reviewed by the provider   Additional needs identified to be addressed with provider yes     Discussed COVID-19 related testing which was available at this time. Test results were negative. Patient informed of results, if available? Yes         Method of communication with provider : phone    Was this a readmission?  no  Patient stated reason for admission: knee surgery   Patients top risk factors for readmission: infection / blood clots     Care Transition Nurse (CTN) contacted the patient by telephone to perform post hospital discharge assessment. Verified name and  with patient as identifiers. Provided introduction to self, and explanation of the CTN role. CTN reviewed discharge instructions, medical action plan and red flags with patient who verbalized understanding. Patient given an opportunity to ask questions and does not have any further questions or concerns at this time. Were discharge instructions available to patient? Yes. Reviewed appropriate site of care based on symptoms and resources available to patient including: After hours contact number- , Urgent care clinics and When to call 911. The parent agrees to contact the PCP office for questions related to their healthcare. Medication reconciliation was performed with patient, who verbalizes understanding of administration of home medications. Advised obtaining a 90-day supply of all daily and as-needed medications. Covid Risk Education    Patient has following risk factors of: no known risk factors. Education provided regarding infection prevention, and signs and symptoms of COVID-19 and when to seek medical attention with patient who verbalized understanding. Discussed exposure protocols and quarantine From CDC: Are you at higher risk for severe illness?   and given an opportunity for questions and concerns. The patient agrees to contact the COVID-19 hotline 947-196-9739 or PCP office for questions related to COVID-19. For more information on steps you can take to protect yourself, see CDC's How to Protect Yourself     Patient/family/caregiver given information for Delfin Montoya and agrees to enroll yes - declined to enroll     Discussed follow-up appointments. If no appointment was previously scheduled, appointment scheduling offered: Yes. Is follow up appointment scheduled within 7 days of discharge?  no    Plan for follow-up call in 5-7 days based on severity of symptoms and risk factors.   Plan for next call: symptom management- , self management- , follow up appointment-  and medication management-   CTN provided contact information for future needs. Summary  CTN spoke to the Pt reports pain in her right knee that she rates a 4/10. Pt states she last took Oxycodone at 12:30pm.  Pt reports that in the morning she does have right knee stiffness and CTN advised that is normal and that stiffness should improve as her knee heals. Pt also reports LBM was today. Pt denies s/s of infection, numbness / tingling to right leg / toes, SOB, cough, and congestion. Pt reports that right knee is not red, not hot to the touch, or have any drainage. Pt reports there is some swelling and confirms right knee is covered with a dry and intact bandage. Pt reports she will pickup Sulindac today and begin using. Pt denies any additional needs / concerns at this time. Follow Up  No future appointments.     Jovanny Jimenez RN

## 2020-10-16 ENCOUNTER — APPOINTMENT (OUTPATIENT)
Dept: GENERAL RADIOLOGY | Age: 75
End: 2020-10-16
Payer: MEDICARE

## 2020-10-16 ENCOUNTER — APPOINTMENT (OUTPATIENT)
Dept: CT IMAGING | Age: 75
End: 2020-10-16
Payer: MEDICARE

## 2020-10-16 ENCOUNTER — HOSPITAL ENCOUNTER (EMERGENCY)
Age: 75
Discharge: HOME | End: 2020-10-17
Attending: STUDENT IN AN ORGANIZED HEALTH CARE EDUCATION/TRAINING PROGRAM
Payer: MEDICARE

## 2020-10-16 LAB
ALBUMIN SERPL-MCNC: 3.9 G/DL (ref 3.4–5)
ALP BLD-CCNC: 335 U/L (ref 40–129)
ALT SERPL-CCNC: 91 U/L (ref 10–40)
ANION GAP SERPL CALCULATED.3IONS-SCNC: 12 MMOL/L (ref 3–16)
AST SERPL-CCNC: 198 U/L (ref 15–37)
BASOPHILS ABSOLUTE: 0.1 K/UL (ref 0–0.2)
BASOPHILS RELATIVE PERCENT: 0.5 %
BILIRUB SERPL-MCNC: 0.6 MG/DL (ref 0–1)
BILIRUBIN DIRECT: <0.2 MG/DL (ref 0–0.3)
BILIRUBIN, INDIRECT: ABNORMAL MG/DL (ref 0–1)
BUN BLDV-MCNC: 17 MG/DL (ref 7–20)
C-REACTIVE PROTEIN: 11.3 MG/L (ref 0–5.1)
CALCIUM SERPL-MCNC: 9.2 MG/DL (ref 8.3–10.6)
CHLORIDE BLD-SCNC: 99 MMOL/L (ref 99–110)
CO2: 21 MMOL/L (ref 21–32)
CREAT SERPL-MCNC: 0.9 MG/DL (ref 0.6–1.2)
EOSINOPHILS ABSOLUTE: 0 K/UL (ref 0–0.6)
EOSINOPHILS RELATIVE PERCENT: 0.1 %
ETHANOL: NORMAL MG/DL (ref 0–0.08)
GFR AFRICAN AMERICAN: >60
GFR NON-AFRICAN AMERICAN: >60
GLUCOSE BLD-MCNC: 99 MG/DL (ref 70–99)
HCT VFR BLD CALC: 30.7 % (ref 36–48)
HEMOGLOBIN: 10.1 G/DL (ref 12–16)
LACTIC ACID, SEPSIS: 1.1 MMOL/L (ref 0.4–1.9)
LYMPHOCYTES ABSOLUTE: 0.4 K/UL (ref 1–5.1)
LYMPHOCYTES RELATIVE PERCENT: 3.9 %
MCH RBC QN AUTO: 31.7 PG (ref 26–34)
MCHC RBC AUTO-ENTMCNC: 32.9 G/DL (ref 31–36)
MCV RBC AUTO: 96.2 FL (ref 80–100)
MONOCYTES ABSOLUTE: 0.8 K/UL (ref 0–1.3)
MONOCYTES RELATIVE PERCENT: 8.2 %
NEUTROPHILS ABSOLUTE: 8.4 K/UL (ref 1.7–7.7)
NEUTROPHILS RELATIVE PERCENT: 87.3 %
PDW BLD-RTO: 13.4 % (ref 12.4–15.4)
PLATELET # BLD: 272 K/UL (ref 135–450)
PMV BLD AUTO: 8.9 FL (ref 5–10.5)
POTASSIUM SERPL-SCNC: 3.8 MMOL/L (ref 3.5–5.1)
RAPID INFLUENZA  B AGN: NEGATIVE
RAPID INFLUENZA A AGN: NEGATIVE
RBC # BLD: 3.19 M/UL (ref 4–5.2)
SEDIMENTATION RATE, ERYTHROCYTE: 34 MM/HR (ref 0–30)
SODIUM BLD-SCNC: 132 MMOL/L (ref 136–145)
TOTAL CK: 91 U/L (ref 26–192)
TOTAL PROTEIN: 6.6 G/DL (ref 6.4–8.2)
WBC # BLD: 9.6 K/UL (ref 4–11)

## 2020-10-16 PROCEDURE — 87040 BLOOD CULTURE FOR BACTERIA: CPT

## 2020-10-16 PROCEDURE — 86140 C-REACTIVE PROTEIN: CPT

## 2020-10-16 PROCEDURE — 80076 HEPATIC FUNCTION PANEL: CPT

## 2020-10-16 PROCEDURE — 87804 INFLUENZA ASSAY W/OPTIC: CPT

## 2020-10-16 PROCEDURE — 99284 EMERGENCY DEPT VISIT MOD MDM: CPT

## 2020-10-16 PROCEDURE — 71045 X-RAY EXAM CHEST 1 VIEW: CPT

## 2020-10-16 PROCEDURE — 96374 THER/PROPH/DIAG INJ IV PUSH: CPT

## 2020-10-16 PROCEDURE — 80048 BASIC METABOLIC PNL TOTAL CA: CPT

## 2020-10-16 PROCEDURE — 85652 RBC SED RATE AUTOMATED: CPT

## 2020-10-16 PROCEDURE — 83605 ASSAY OF LACTIC ACID: CPT

## 2020-10-16 PROCEDURE — 36415 COLL VENOUS BLD VENIPUNCTURE: CPT

## 2020-10-16 PROCEDURE — 74176 CT ABD & PELVIS W/O CONTRAST: CPT

## 2020-10-16 PROCEDURE — 6370000000 HC RX 637 (ALT 250 FOR IP): Performed by: SURGERY

## 2020-10-16 PROCEDURE — 6360000002 HC RX W HCPCS: Performed by: SURGERY

## 2020-10-16 PROCEDURE — 82550 ASSAY OF CK (CPK): CPT

## 2020-10-16 PROCEDURE — 83690 ASSAY OF LIPASE: CPT

## 2020-10-16 PROCEDURE — 70450 CT HEAD/BRAIN W/O DYE: CPT

## 2020-10-16 PROCEDURE — 85025 COMPLETE CBC W/AUTO DIFF WBC: CPT

## 2020-10-16 PROCEDURE — G0480 DRUG TEST DEF 1-7 CLASSES: HCPCS

## 2020-10-16 RX ORDER — MORPHINE SULFATE 4 MG/ML
4 INJECTION, SOLUTION INTRAMUSCULAR; INTRAVENOUS ONCE
Status: COMPLETED | OUTPATIENT
Start: 2020-10-16 | End: 2020-10-16

## 2020-10-16 RX ORDER — ACETAMINOPHEN 325 MG/1
650 TABLET ORAL ONCE
Status: COMPLETED | OUTPATIENT
Start: 2020-10-16 | End: 2020-10-16

## 2020-10-16 RX ORDER — ACETAMINOPHEN 325 MG/1
325 TABLET ORAL ONCE
Status: DISCONTINUED | OUTPATIENT
Start: 2020-10-16 | End: 2020-10-16

## 2020-10-16 RX ADMIN — MORPHINE SULFATE 4 MG: 4 INJECTION INTRAVENOUS at 22:13

## 2020-10-16 RX ADMIN — ACETAMINOPHEN 650 MG: 325 TABLET ORAL at 22:13

## 2020-10-16 ASSESSMENT — PAIN SCALES - GENERAL: PAINLEVEL_OUTOF10: 9

## 2020-10-17 VITALS
SYSTOLIC BLOOD PRESSURE: 105 MMHG | HEART RATE: 78 BPM | DIASTOLIC BLOOD PRESSURE: 57 MMHG | OXYGEN SATURATION: 97 % | RESPIRATION RATE: 16 BRPM | TEMPERATURE: 99 F

## 2020-10-17 LAB
BILIRUBIN URINE: NEGATIVE
BLOOD, URINE: NEGATIVE
CLARITY: CLEAR
COLOR: YELLOW
GLUCOSE URINE: NEGATIVE MG/DL
KETONES, URINE: 15 MG/DL
LEUKOCYTE ESTERASE, URINE: NEGATIVE
LIPASE: 53 U/L (ref 13–60)
MICROSCOPIC EXAMINATION: ABNORMAL
NITRITE, URINE: NEGATIVE
PH UA: 6 (ref 5–8)
PROTEIN UA: NEGATIVE MG/DL
SPECIFIC GRAVITY UA: 1.02 (ref 1–1.03)
URINE REFLEX TO CULTURE: ABNORMAL
URINE TYPE: ABNORMAL
UROBILINOGEN, URINE: 1 E.U./DL

## 2020-10-17 PROCEDURE — 2580000003 HC RX 258: Performed by: EMERGENCY MEDICINE

## 2020-10-17 PROCEDURE — 81003 URINALYSIS AUTO W/O SCOPE: CPT

## 2020-10-17 RX ORDER — POLYETHYLENE GLYCOL 3350 17 G/17G
17 POWDER, FOR SOLUTION ORAL DAILY PRN
Qty: 510 G | Refills: 0 | Status: SHIPPED | OUTPATIENT
Start: 2020-10-17 | End: 2020-11-16

## 2020-10-17 RX ORDER — SENNA AND DOCUSATE SODIUM 50; 8.6 MG/1; MG/1
1 TABLET, FILM COATED ORAL DAILY
Qty: 30 TABLET | Refills: 0 | Status: SHIPPED | OUTPATIENT
Start: 2020-10-17 | End: 2020-11-16

## 2020-10-17 RX ORDER — SODIUM CHLORIDE, SODIUM LACTATE, POTASSIUM CHLORIDE, AND CALCIUM CHLORIDE .6; .31; .03; .02 G/100ML; G/100ML; G/100ML; G/100ML
1000 INJECTION, SOLUTION INTRAVENOUS ONCE
Status: COMPLETED | OUTPATIENT
Start: 2020-10-17 | End: 2020-10-17

## 2020-10-17 RX ADMIN — SODIUM CHLORIDE, POTASSIUM CHLORIDE, SODIUM LACTATE AND CALCIUM CHLORIDE 1000 ML: 600; 310; 30; 20 INJECTION, SOLUTION INTRAVENOUS at 01:00

## 2020-10-17 NOTE — ED PROVIDER NOTES
[address] [facility] [address] RESIDENT NOTE       Date of evaluation: 10/16/2020    Chief Complaint     Fever (pt has been in the hospital twice in the past 2 weeks, RTK here 2 weeks ago and then 2 days ago at [facility] because she states she thought she was having a heart attack. pt here today for new onset fever. states she has not taken any medication.)      History of Present Illness     Byron Raymond is a 70 y.o. female who presents with 24 hours duration of fever, myalgias, n/v x 3 approximately. Patient is lethargic and unable to give good history 2/2 lethargy and confusion. She had recent right TK revision approximately 2 weeks ago. She just began physical therapy. She began feeling symptoms yesterday evening described as \"doubled over and vomiting\" by the daughter. They thought she might be having a heart attack due to the associated chest pain so they took her to [facility] where she was diagnosed with mild case of pancreatitis and treated with fluids and discharged today after she tolerated lunch. In the ED here she is febrile to 102. Patient reports a fall yesterday and she is on Eliquis for ppx in context of recent knee surgery. She denies nausea, vomiting at this time but complains of body aches. She denies chest pain, but does endorse midline back pain. She was tested for COVID at [facility] which came back negative. She was also worked up for PE which came back negative as well. Review of Systems     Review of Systems  13 point ROS negative except as stated above. Past Medical, Surgical, Family, and Social History     She has a past medical history of Anesthesia, Arthritis, Cerebral artery occlusion with cerebral infarction ([address] Gastric ulcer, History of esophagogastroduodenoscopy, Hyperlipidemia, Hypertension, Kidney stone, and Osteoporosis. She has a past surgical history that includes Hysterectomy; joint replacement (Bilateral); Cholecystectomy;  Lithotripsy; Dilation and curettage of uterus; Gastric bypass surgery; Foot surgery; other surgical history (Left, 07/11/2017); Nose surgery; other surgical history (Right, 10/24/2017); Breast surgery; and Revision total knee arthroplasty (Right, 9/29/2020). Her family history is not on file. She reports that she quit smoking about 43 years ago. She quit after 10.00 years of use. She has never used smokeless tobacco. She reports previous alcohol use. She reports that she does not use drugs. Medications     Previous Medications    ACETAMINOPHEN (TYLENOL) 325 MG TABLET    Take 650 mg by mouth every 6 hours as needed for Pain    ATORVASTATIN (LIPITOR) 40 MG TABLET    Take 1 tablet by mouth nightly    CITALOPRAM (CELEXA) 40 MG TABLET    Take 40 mg by mouth daily    GABAPENTIN (NEURONTIN) 300 MG CAPSULE    Take 300 mg by mouth daily. LISINOPRIL (PRINIVIL;ZESTRIL) 20 MG TABLET    Take 40 mg by mouth daily     LORAZEPAM (ATIVAN) 1 MG TABLET    Take 1 mg by mouth nightly as needed (sleep). PANTOPRAZOLE (PROTONIX) 40 MG TABLET    Take 40 mg by mouth daily    PRENATAL VIT-FE FUMARATE-FA (PRENATAL 1 PLUS 1 PO)    Take by mouth    SULINDAC (CLINORIL) 150 MG TABLET    Take 1 tablet by mouth 2 times daily    VITAMIN D (CHOLECALCIFEROL) 1000 UNIT TABS TABLET    Take 1,000 Units by mouth daily       Allergies     She is allergic to sulfa antibiotics. Physical Exam     INITIAL VITALS: BP: 112/69, Temp: 102.2 °F (39 °C), Pulse: 80, Resp: 16, SpO2: 95 %   Physical Exam  Constitutional:       General: She is not in acute distress. Appearance: She is ill-appearing. HENT:      Head: Normocephalic and atraumatic. Nose: Nose normal.      Mouth/Throat:      Mouth: Mucous membranes are moist.      Pharynx: Oropharynx is clear. Eyes:      Extraocular Movements: Extraocular movements intact. Conjunctiva/sclera: Conjunctivae normal.      Pupils: Pupils are equal, round, and reactive to light.    Cardiovascular:      Rate and Rhythm: Normal rate and regular rhythm. Heart sounds: Murmur present. Pulmonary:      Effort: Pulmonary effort is normal. No respiratory distress. Breath sounds: Normal breath sounds. No wheezing, rhonchi or rales. Abdominal:      General: Abdomen is flat. Bowel sounds are normal. There is no distension. Palpations: Abdomen is soft. Tenderness: There is no abdominal tenderness. There is no guarding. Musculoskeletal: Normal range of motion. Right lower leg: No edema. Left lower leg: No edema. Comments: Right total knee revision with surgical dressing in place, does not appear erythematous or swollen   Skin:     Capillary Refill: Capillary refill takes less than 2 seconds. Neurological:      Mental Status: She is disoriented. Psychiatric:         Mood and Affect: Mood normal.         Behavior: Behavior normal.         Thought Content: Thought content normal.         Judgment: Judgment normal.         Diagnostic Results       RADIOLOGY:  CT HEAD WO CONTRAST   Final Result      1. No acute intracranial process. 2.  Mild cerebral atrophy and moderate chronic small vessel ischemic disease. 3.  Chronic left sphenoid sinusitis. XR CHEST PORTABLE   Final Result      No acute pulmonary disease. Small hiatal hernia.          CT ABDOMEN PELVIS WO CONTRAST Additional Contrast? None    (Results Pending)       LABS:   Results for orders placed or performed during the hospital encounter of 10/16/20   Rapid influenza A/B antigens    Specimen: Nasopharyngeal   Result Value Ref Range    Rapid Influenza A Ag Negative Negative    Rapid Influenza B Ag Negative Negative   Basic Metabolic Panel   Result Value Ref Range    Sodium 132 (L) 136 - 145 mmol/L    Potassium 3.8 3.5 - 5.1 mmol/L    Chloride 99 99 - 110 mmol/L    CO2 21 21 - 32 mmol/L    Anion Gap 12 3 - 16    Glucose 99 70 - 99 mg/dL    BUN 17 7 - 20 mg/dL    CREATININE 0.9 0.6 - 1.2 mg/dL    GFR Non-African American >60 >60 GFR African American >60 >60    Calcium 9.2 8.3 - 10.6 mg/dL   CBC Auto Differential   Result Value Ref Range    WBC 9.6 4.0 - 11.0 K/uL    RBC 3.19 (L) 4.00 - 5.20 M/uL    Hemoglobin 10.1 (L) 12.0 - 16.0 g/dL    Hematocrit 30.7 (L) 36.0 - 48.0 %    MCV 96.2 80.0 - 100.0 fL    MCH 31.7 26.0 - 34.0 pg    MCHC 32.9 31.0 - 36.0 g/dL    RDW 13.4 12.4 - 15.4 %    Platelets 337 070 - 657 K/uL    MPV 8.9 5.0 - 10.5 fL    Neutrophils % 87.3 %    Lymphocytes % 3.9 %    Monocytes % 8.2 %    Eosinophils % 0.1 %    Basophils % 0.5 %    Neutrophils Absolute 8.4 (H) 1.7 - 7.7 K/uL    Lymphocytes Absolute 0.4 (L) 1.0 - 5.1 K/uL    Monocytes Absolute 0.8 0.0 - 1.3 K/uL    Eosinophils Absolute 0.0 0.0 - 0.6 K/uL    Basophils Absolute 0.1 0.0 - 0.2 K/uL   Lactate, Sepsis   Result Value Ref Range    Lactic Acid, Sepsis 1.1 0.4 - 1.9 mmol/L   Sedimentation Rate   Result Value Ref Range    Sed Rate 34 (H) 0 - 30 mm/Hr   C-reactive protein   Result Value Ref Range    CRP 11.3 (H) 0.0 - 5.1 mg/L   CK (Lab)   Result Value Ref Range    Total CK 91 26 - 192 U/L   Ethanol   Result Value Ref Range    Ethanol Lvl None Detected mg/dL   Hepatic function panel (LFTs)   Result Value Ref Range    Total Protein 6.6 6.4 - 8.2 g/dL    Alb 3.9 3.4 - 5.0 g/dL    Alkaline Phosphatase 335 (H) 40 - 129 U/L    ALT 91 (H) 10 - 40 U/L     (H) 15 - 37 U/L    Total Bilirubin 0.6 0.0 - 1.0 mg/dL    Bilirubin, Direct <0.2 0.0 - 0.3 mg/dL    Bilirubin, Indirect see below 0.0 - 1.0 mg/dL       RECENT VITALS:  BP: (!) 94/56, Temp: 102.2 °F (39 °C),Pulse: 80, Resp: 16, SpO2: 97 %     Procedures     none    ED Course     Nursing Notes, Past Medical Hx, Past Surgical Hx, Social Hx, Allergies, and FamilyHx were reviewed.     The patient was giventhe following medications:  Orders Placed This Encounter   Medications    DISCONTD: acetaminophen (TYLENOL) tablet 325 mg    acetaminophen (TYLENOL) tablet 650 mg    morphine injection 4 mg       CONSULTS:  IP CONSULT TO HOSPITALIST    MEDICAL DECISION MAKING / ASSESSMENT / Lotus Gabriel is a 70 y.o. female with fever, myalgias, and vomiting in the context of recent admission for pancreatitis yesterday as well as recent right knee replacement revision 2 weeks ago. Clinical picture does not correlate well with pancreatitis (no abd pain, denying nausea on evaluation)  Recent CT-AP at [facility] without signs of abscess or source of infection  Nonleukocytotic on CBC  CXR negative for acute pathology  Blood cultures drawn    Right knee does no seem infected given appropriate level of pain and lack of erythema or swelling as well as only mildly elevated inflammatory markers. Will defer decision for arthrocentesis to admitting team.     Given dilated CBD on CT-AP w/ contrast this AM at [facility], will rescan w/o contrast to re-evaluate for obstruction given interval increase in alk phos. May require MRCP after admission to definitively r/o biliary obstruction. This patient was also evaluated by the attending physician. All care plans were discussed and agreed upon. Clinical Impression     1. Fever, unspecified fever cause        Disposition     PATIENT REFERRED TO:  No follow-up provider specified.     DISCHARGE MEDICATIONS:  New Prescriptions    No medications on file       DISPOSITION Decision To Admit 10/16/2020 11:01:57 PM       Maria Esther Wu MD  Resident  10/16/20 4013

## 2020-10-17 NOTE — ED NOTES
Pt alert and orientated. Pt states that she knew she was confused earlier but that she feel better now. Pts temp now 99.0. Pt able to urinate per bedpan. Resident at bedside to speak with pt.       December TI Wagoner  10/17/20 0124

## 2020-10-17 NOTE — ED NOTES
Pt discharged to home, alert and oriented. Denies any questions about discharge instructions. Will follow up as directed. encouraged to return for any worsening symptoms.         Bismark Sanders RN  10/17/20 9352

## 2020-10-17 NOTE — ED PROVIDER NOTES
ED Attending Attestation Note     Date of evaluation: 10/16/2020    This patient was seen by the resident. I have seen and examined the patient, agree with the workup, evaluation, management and diagnosis. The care plan has been discussed. My assessment reveals 79-year-old female who presents with fever and confusion. Patient was just evaluated in outside hospital yesterday with admission for pancreatitis but discharged earlier today after tolerating oral intake. Daughter who is with patient states that she has been complaining of pain and general malaise throughout the day with confusion, patient denies cough or shortness of breath, denies chest pain or abdominal pain currently. Daughter states that patient reported to her that she had fallen at home earlier today and spent several hours on the floor although it is difficult to tell whether this is accurate given patient's current presentation. Patient did have a total knee revision on 9/29, approximately 16 days ago however on examination today the knee is not swollen, erythematous, warm when compared with left knee, and patient has no pain with axial loading or rotation of the lower leg making right knee a possible source of infection lower on differential.  We will plan to obtain basic laboratory work including abdominal labs, CK and ESR/CRP given concern for fall and possible bone/joint infection, will also plan to obtain urinalysis, urine cultures, and blood cultures as well as chest x-ray and CT scan of the head given concern for possible fall now with confusion. Patient will likely require admission to the hospital and antibiotics however given source is unclear at this time we will await for the results. Patient CARE signed out to oncoming provider, time of signout patient awaiting laboratory work and admission.      Hien Tim MD  10/16/20 7840

## 2020-10-17 NOTE — ED PROVIDER NOTES
[address] [facility] ENCOUNTER          ATTENDING PHYSICIAN NOTE       Date of evaluation: 10/16/2020    ADDENDUM:      Care of this patient was assumed from Dr. Radha Colindres. The patient was seen for Fever (pt has been in the hospital twice in the past 2 weeks, RTK here 2 weeks ago and then 2 days ago at [facility] because she states she thought she was having a heart attack. pt here today for new onset fever. states she has not taken any medication.)  . The patient's initial evaluation and plan have been discussed with the prior provider who initially evaluated the patient. Nursing Notes, Past Medical Hx, Past Surgical Hx, Social Hx, Allergies, and Family Hx were all reviewed. Diagnostic Results     EKG       RADIOLOGY:  CT ABDOMEN PELVIS WO CONTRAST Additional Contrast? None   Final Result   1. Moderate hiatal hernia. 2.  Prior cholecystectomy. Nonspecific prominent biliary dilatation. Although this could be secondary to postcholecystectomy ductal ectasia,    further evaluation could be performed with ERCP or MRCP if clinically    indicated. 3.  Moderate to large amount of stool throughout the colon which may    represent constipation. No bowel obstruction. 4.  Mild prominence of the bladder wall is nonspecific. Please correlate    with urinalysis if concerned for cystitis. CT HEAD WO CONTRAST   Final Result      1. No acute intracranial process. 2.  Mild cerebral atrophy and moderate chronic small vessel ischemic disease. 3.  Chronic left sphenoid sinusitis. XR CHEST PORTABLE   Final Result      No acute pulmonary disease. Small hiatal hernia.              LABS:   Results for orders placed or performed during the hospital encounter of 10/16/20   Rapid influenza A/B antigens    Specimen: Nasopharyngeal   Result Value Ref Range    Rapid Influenza A Ag Negative Negative    Rapid Influenza B Ag Negative Negative   Basic Metabolic Panel   Result Value Ref Range Sodium 132 (L) 136 - 145 mmol/L    Potassium 3.8 3.5 - 5.1 mmol/L    Chloride 99 99 - 110 mmol/L    CO2 21 21 - 32 mmol/L    Anion Gap 12 3 - 16    Glucose 99 70 - 99 mg/dL    BUN 17 7 - 20 mg/dL    CREATININE 0.9 0.6 - 1.2 mg/dL    GFR Non-African American >60 >60    GFR African American >60 >60    Calcium 9.2 8.3 - 10.6 mg/dL   CBC Auto Differential   Result Value Ref Range    WBC 9.6 4.0 - 11.0 K/uL    RBC 3.19 (L) 4.00 - 5.20 M/uL    Hemoglobin 10.1 (L) 12.0 - 16.0 g/dL    Hematocrit 30.7 (L) 36.0 - 48.0 %    MCV 96.2 80.0 - 100.0 fL    MCH 31.7 26.0 - 34.0 pg    MCHC 32.9 31.0 - 36.0 g/dL    RDW 13.4 12.4 - 15.4 %    Platelets 046 660 - 405 K/uL    MPV 8.9 5.0 - 10.5 fL    Neutrophils % 87.3 %    Lymphocytes % 3.9 %    Monocytes % 8.2 %    Eosinophils % 0.1 %    Basophils % 0.5 %    Neutrophils Absolute 8.4 (H) 1.7 - 7.7 K/uL    Lymphocytes Absolute 0.4 (L) 1.0 - 5.1 K/uL    Monocytes Absolute 0.8 0.0 - 1.3 K/uL    Eosinophils Absolute 0.0 0.0 - 0.6 K/uL    Basophils Absolute 0.1 0.0 - 0.2 K/uL   Lactate, Sepsis   Result Value Ref Range    Lactic Acid, Sepsis 1.1 0.4 - 1.9 mmol/L   Urinalysis Reflex to Culture    Specimen: Urine, clean catch   Result Value Ref Range    Color, UA Yellow Straw/Yellow    Clarity, UA Clear Clear    Glucose, Ur Negative Negative mg/dL    Bilirubin Urine Negative Negative    Ketones, Urine 15 (A) Negative mg/dL    Specific Gravity, UA 1.020 1.005 - 1.030    Blood, Urine Negative Negative    pH, UA 6.0 5.0 - 8.0    Protein, UA Negative Negative mg/dL    Urobilinogen, Urine 1.0 <2.0 E.U./dL    Nitrite, Urine Negative Negative    Leukocyte Esterase, Urine Negative Negative    Microscopic Examination Not Indicated     Urine Type Voided     Urine Reflex to Culture Not Indicated    Sedimentation Rate   Result Value Ref Range    Sed Rate 34 (H) 0 - 30 mm/Hr   C-reactive protein   Result Value Ref Range    CRP 11.3 (H) 0.0 - 5.1 mg/L   CK (Lab)   Result Value Ref Range    Total CK 91 26 - 192 U/L   Ethanol   Result Value Ref Range    Ethanol Lvl None Detected mg/dL   Hepatic function panel (LFTs)   Result Value Ref Range    Total Protein 6.6 6.4 - 8.2 g/dL    Alb 3.9 3.4 - 5.0 g/dL    Alkaline Phosphatase 335 (H) 40 - 129 U/L    ALT 91 (H) 10 - 40 U/L     (H) 15 - 37 U/L    Total Bilirubin 0.6 0.0 - 1.0 mg/dL    Bilirubin, Direct <0.2 0.0 - 0.3 mg/dL    Bilirubin, Indirect see below 0.0 - 1.0 mg/dL       RECENT VITALS:  BP: 105/62, Temp: 99 °F (37.2 °C), Pulse: 78, Resp: 16, SpO2: 94 %     Procedures         ED Course     The patient was given the following medications:  Orders Placed This Encounter   Medications    DISCONTD: acetaminophen (TYLENOL) tablet 325 mg    acetaminophen (TYLENOL) tablet 650 mg    morphine injection 4 mg    lactated ringers bolus    polyethylene glycol (GLYCOLAX) 17 GM/SCOOP powder     Sig: Take 17 g by mouth daily as needed (Please take twice daily until having 1-2 soft bowel movements daily then decrease to 1 time daily.)     Dispense:  510 g     Refill:  0    sennosides-docusate sodium (SENOKOT-S) 8.6-50 MG tablet     Sig: Take 1 tablet by mouth daily     Dispense:  30 tablet     Refill:  0    Misc. Devices (COMMODE BEDSIDE) MISC     Si Device by Does not apply route as needed (bedside commode as needed)     Dispense:  1 each     Refill:  0       CONSULTS:  IP CONSULT TO HOSPITALIST  IP CONSULT TO [address] [place] [address] / Deanna Sim / Geoff Shannon is a 70 y.o. female the patient was turned over to me pending completion of her laboratory and imaging studies. Briefly, this is a 26-year-old female patient who was recently mated at [facility] for suspected pancreatitis. She had a downtrending lipase and was able to tolerate p.o. and was discharged home. She presented to this emergency department and was seen by the prior team for concern of abdominal pain and fever.   At time of turnover, the patient's fever has improved with administration of acetaminophen. Her work-up here including laboratory studies and imaging has been overall very reassuring. Urinalysis is normal, flu swab normal, renal function normal, liver enzymes are slightly elevated but consistent with prior levels given her prior cholecystectomy. Blood counts did not reveal an elevated white blood cell count and her lactate is normal.  ESR and CRP are mildly elevated however likely mildly elevated given her recent knee operation. Clinically, the patient's knee is not red, particularly swollen or tender. She has decent range of motion given her postoperative state. Blood cultures were performed by the prior team however my suspicion for bloodstream infection is overall quite low. I had a lengthy discussion with the patient regarding admission and she would prefer to go home as she is feeling significantly better. Ultimately, the cause of her fever is unknown. CAT scan did show a large amount of stool in her colon and she has a hiatal hernia which may have been a cause of her abdominal pain. I had lengthy discussions with her 2 daughters who are comfortable with taking her home. We talked about strict return precautions and home care instructions as well as follow-up with her primary care doctor and orthopedic surgeon. All questions were answered from family and the patient. Prior to discharge she was again offered admission but would prefer to go home. I believe this is reasonable given her work-up today. Family was counseled on her pending blood cultures. Clinical Impression     1. Fever, unspecified fever cause    2. Hiatal hernia    3. Loosening of knee joint prosthesis, subsequent encounter        Disposition     PATIENT REFERRED TO:  Ayah Dykes MD  [address]  [address] [place] [address]  [phone]            DISCHARGE MEDICATIONS:  New Prescriptions    MISC.  DEVICES (COMMODE BEDSIDE) MISC    1 Device by Does not apply route as

## 2020-10-17 NOTE — ED PROVIDER NOTES
I did not see this patient please refer to Dr. Constantin Toney and Dr. Melanie Mojica note.      Tanvir Cannon MD  10/22/20 1974

## 2020-10-17 NOTE — ED NOTES
Assisted patient with getting dressed, iv removed, reviewed discharge instructions with patient and RX x 3 given as ordered. Awaiting for daughters to  this am and take home.       Abdi Trevino RN  10/17/20 [id number]

## 2020-10-19 ENCOUNTER — CARE COORDINATION (OUTPATIENT)
Dept: CARE COORDINATION | Age: 75
End: 2020-10-19

## 2020-10-19 ENCOUNTER — CARE COORDINATION (OUTPATIENT)
Dept: CASE MANAGEMENT | Age: 75
End: 2020-10-19

## 2020-10-19 LAB
ANAEROBIC CULTURE: NORMAL
GRAM STAIN RESULT: NORMAL
WOUND/ABSCESS: NORMAL

## 2020-10-19 NOTE — CARE COORDINATION
Patient contacted regarding recent discharge and COVID-19 risk. Discussed COVID-19 related testing which was not done at this time. Test results were not done. Patient informed of results, if available? n/a     Care Transition Nurse/ Ambulatory Care Manager contacted the patient by telephone to perform post discharge assessment. Verified name and  with patient as identifiers. Patient has following risk factors of: no known risk factors. CTN/ACM reviewed discharge instructions, medical action plan and red flags related to discharge diagnosis. Reviewed and educated them on any new and changed medications related to discharge diagnosis. Advised obtaining a 90-day supply of all daily and as-needed medications. Education provided regarding infection prevention, and signs and symptoms of COVID-19 and when to seek medical attention with patient who verbalized understanding. Discussed exposure protocols and quarantine from [address] you at higher risk for severe illness  and given an opportunity for questions and concerns. The patient agrees to contact the COVID-19 hotline [phone] or PCP office for questions related to their healthcare. CTN/ACM provided contact information for future reference. From CDC: Are you at higher risk for severe illness?  Wash your hands often.  Avoid close contact (6 feet, which is about two arm lengths) with people who are sick.  Put distance between yourself and other people if COVID-19 is spreading in your community.  Clean and disinfect frequently touched surfaces.  Avoid all cruise travel and non-essential air travel.  Call your healthcare professional if you have concerns about COVID-19 and your underlying condition or if you are sick. For more information on steps you can take to protect yourself, see CDC's How to Carly for follow-up call in 5-7 days based on severity of symptoms and risk factors.     Pt reports she no longer has a fever or ABD pain. She stated she picked up her medication and does not have any questions/concerns regarding that at this time. She reports she will call her PCP and let them know she was in the ED over the weekend. ACM provided pt with Nurse Triage contact information. Pt did not have any other questions/concerns for ACM at this time.

## 2020-10-20 LAB
BLOOD CULTURE, ROUTINE: NORMAL
CULTURE, BLOOD 2: NORMAL

## 2020-10-21 NOTE — CARE COORDINATION
[facility] Transitions Follow Up Call    10/19/2020    Patient: Byron Raymond  Patient : 1945   MRN: [id number]   Reason for Admission:  Right TKR   Discharge Date: 10/17/20 RARS: Readmission Risk Score: 6         Spoke with: [id number] Transitions Subsequent and Final Call    Subsequent and Final Calls  Do you have any ongoing symptoms?:  Yes  Patient-reported symptoms:  Pain  Interventions for patient-reported symptoms:  Other  Have your medications changed?:  No  Do you have any questions related to your medications?:  No  Do you currently have any active services?:  No  Do you have any needs or concerns that I can assist you with?:  No  Identified Barriers:  None  Care Transitions Interventions  Other Interventions:          Summary  CTN spoke to the Pt who reports pain in right knee and rates it a /10. Pt states she continues to take oxycodone PRN. LBM was today and Pt denies numbness and tingling to right leg / toes. Pt states she has had two trips to the hospital recently but is doing ok today. Pt reports that right knee is not red, hot to the touch, or have drainage but is swollen. CTN encouraged Pt to continue to ice and elevate right leg as recommended. Pt states she had therapy today and is icing her knee at this time. Pt has appt with ortho doctor on 10/21 and declined to see PCP and plans to continue being followed by ortho surgeon. From [facility]: Are you at higher risk for severe illness?  Wash your hands often.  Avoid close contact (6 feet, which is about two arm lengths) with people who are sick.  Put distance between yourself and other people if COVID-19 is spreading in your community.  Clean and disinfect frequently touched surfaces.  Avoid all cruise travel and non-essential air travel.  Call your healthcare professional if you have concerns about COVID-19 and your underlying condition or if you are sick.     Pt will take all meds as prescribed and schedule / keep doctors appt. CTC provided education on s/s that require medical attention and when to seek medical attention. CTC advised Pt of use urgent care or physicians 24 hr access line if assistance is needed after hours or on the weekend. Pt denies any needs or concerns. Follow Up  No future appointments.     Christina Donohue RN

## 2020-10-26 ENCOUNTER — CARE COORDINATION (OUTPATIENT)
Dept: CARE COORDINATION | Age: 75
End: 2020-10-26

## 2023-01-18 ENCOUNTER — OFFICE VISIT (OUTPATIENT)
Dept: FAMILY MEDICINE CLINIC | Age: 78
End: 2023-01-18
Payer: MEDICARE

## 2023-01-18 VITALS
RESPIRATION RATE: 16 BRPM | WEIGHT: 120 LBS | HEIGHT: 59 IN | SYSTOLIC BLOOD PRESSURE: 124 MMHG | BODY MASS INDEX: 24.19 KG/M2 | HEART RATE: 94 BPM | DIASTOLIC BLOOD PRESSURE: 82 MMHG | TEMPERATURE: 97.3 F | OXYGEN SATURATION: 94 %

## 2023-01-18 DIAGNOSIS — Z00.00 PHYSICAL EXAM: ICD-10-CM

## 2023-01-18 DIAGNOSIS — M15.9 PRIMARY OSTEOARTHRITIS INVOLVING MULTIPLE JOINTS: ICD-10-CM

## 2023-01-18 DIAGNOSIS — M81.0 AGE-RELATED OSTEOPOROSIS WITHOUT CURRENT PATHOLOGICAL FRACTURE: ICD-10-CM

## 2023-01-18 DIAGNOSIS — Z00.00 ENCOUNTER FOR MEDICAL EXAMINATION TO ESTABLISH CARE: Primary | ICD-10-CM

## 2023-01-18 DIAGNOSIS — E78.2 MIXED HYPERLIPIDEMIA: ICD-10-CM

## 2023-01-18 PROCEDURE — 3017F COLORECTAL CA SCREEN DOC REV: CPT | Performed by: NURSE PRACTITIONER

## 2023-01-18 PROCEDURE — G8420 CALC BMI NORM PARAMETERS: HCPCS | Performed by: NURSE PRACTITIONER

## 2023-01-18 PROCEDURE — 1090F PRES/ABSN URINE INCON ASSESS: CPT | Performed by: NURSE PRACTITIONER

## 2023-01-18 PROCEDURE — 1123F ACP DISCUSS/DSCN MKR DOCD: CPT | Performed by: NURSE PRACTITIONER

## 2023-01-18 PROCEDURE — G8400 PT W/DXA NO RESULTS DOC: HCPCS | Performed by: NURSE PRACTITIONER

## 2023-01-18 PROCEDURE — 99204 OFFICE O/P NEW MOD 45 MIN: CPT | Performed by: NURSE PRACTITIONER

## 2023-01-18 PROCEDURE — 1036F TOBACCO NON-USER: CPT | Performed by: NURSE PRACTITIONER

## 2023-01-18 PROCEDURE — G8484 FLU IMMUNIZE NO ADMIN: HCPCS | Performed by: NURSE PRACTITIONER

## 2023-01-18 PROCEDURE — G8427 DOCREV CUR MEDS BY ELIG CLIN: HCPCS | Performed by: NURSE PRACTITIONER

## 2023-01-18 RX ORDER — DICLOFENAC SODIUM 75 MG/1
75 TABLET, DELAYED RELEASE ORAL 2 TIMES DAILY
Qty: 60 TABLET | Refills: 1 | Status: SHIPPED | OUTPATIENT
Start: 2023-01-18

## 2023-01-18 RX ORDER — IBUPROFEN 200 MG
1 CAPSULE ORAL DAILY
COMMUNITY

## 2023-01-18 RX ORDER — ACETAMINOPHEN, ASPIRIN AND CAFFEINE 250; 250; 65 MG/1; MG/1; MG/1
TABLET, FILM COATED ORAL DAILY PRN
COMMUNITY

## 2023-01-18 SDOH — ECONOMIC STABILITY: FOOD INSECURITY: WITHIN THE PAST 12 MONTHS, YOU WORRIED THAT YOUR FOOD WOULD RUN OUT BEFORE YOU GOT MONEY TO BUY MORE.: NEVER TRUE

## 2023-01-18 SDOH — ECONOMIC STABILITY: FOOD INSECURITY: WITHIN THE PAST 12 MONTHS, THE FOOD YOU BOUGHT JUST DIDN'T LAST AND YOU DIDN'T HAVE MONEY TO GET MORE.: NEVER TRUE

## 2023-01-18 SDOH — ECONOMIC STABILITY: INCOME INSECURITY: IN THE LAST 12 MONTHS, WAS THERE A TIME WHEN YOU WERE NOT ABLE TO PAY THE MORTGAGE OR RENT ON TIME?: NO

## 2023-01-18 SDOH — ECONOMIC STABILITY: HOUSING INSECURITY
IN THE LAST 12 MONTHS, WAS THERE A TIME WHEN YOU DID NOT HAVE A STEADY PLACE TO SLEEP OR SLEPT IN A SHELTER (INCLUDING NOW)?: NO

## 2023-01-18 SDOH — ECONOMIC STABILITY: TRANSPORTATION INSECURITY
IN THE PAST 12 MONTHS, HAS LACK OF TRANSPORTATION KEPT YOU FROM MEETINGS, WORK, OR FROM GETTING THINGS NEEDED FOR DAILY LIVING?: NO

## 2023-01-18 SDOH — ECONOMIC STABILITY: TRANSPORTATION INSECURITY
IN THE PAST 12 MONTHS, HAS THE LACK OF TRANSPORTATION KEPT YOU FROM MEDICAL APPOINTMENTS OR FROM GETTING MEDICATIONS?: NO

## 2023-01-18 ASSESSMENT — ANXIETY QUESTIONNAIRES
4. TROUBLE RELAXING: 0
1. FEELING NERVOUS, ANXIOUS, OR ON EDGE: 0
2. NOT BEING ABLE TO STOP OR CONTROL WORRYING: 0
7. FEELING AFRAID AS IF SOMETHING AWFUL MIGHT HAPPEN: 0
5. BEING SO RESTLESS THAT IT IS HARD TO SIT STILL: 0
GAD7 TOTAL SCORE: 0
6. BECOMING EASILY ANNOYED OR IRRITABLE: 0
3. WORRYING TOO MUCH ABOUT DIFFERENT THINGS: 0
IF YOU CHECKED OFF ANY PROBLEMS ON THIS QUESTIONNAIRE, HOW DIFFICULT HAVE THESE PROBLEMS MADE IT FOR YOU TO DO YOUR WORK, TAKE CARE OF THINGS AT HOME, OR GET ALONG WITH OTHER PEOPLE: NOT DIFFICULT AT ALL

## 2023-01-18 ASSESSMENT — PATIENT HEALTH QUESTIONNAIRE - PHQ9
SUM OF ALL RESPONSES TO PHQ QUESTIONS 1-9: 0
2. FEELING DOWN, DEPRESSED OR HOPELESS: 0
SUM OF ALL RESPONSES TO PHQ QUESTIONS 1-9: 0
SUM OF ALL RESPONSES TO PHQ9 QUESTIONS 1 & 2: 0
1. LITTLE INTEREST OR PLEASURE IN DOING THINGS: 0

## 2023-01-18 ASSESSMENT — ENCOUNTER SYMPTOMS
CHEST TIGHTNESS: 0
SINUS PRESSURE: 0
PHOTOPHOBIA: 0
ABDOMINAL DISTENTION: 0
CONSTIPATION: 0
BLOOD IN STOOL: 0
EYE DISCHARGE: 0
EYES NEGATIVE: 1
EYE ITCHING: 0
FACIAL SWELLING: 0
RECTAL PAIN: 0
EYE REDNESS: 0
VOMITING: 0
BACK PAIN: 1
ANAL BLEEDING: 0
CHOKING: 0
RHINORRHEA: 0
SINUS PAIN: 0
GASTROINTESTINAL NEGATIVE: 1
SHORTNESS OF BREATH: 0
COLOR CHANGE: 0
ABDOMINAL PAIN: 0
NAUSEA: 0
COUGH: 0
WHEEZING: 0
DIARRHEA: 0
VOICE CHANGE: 0
SORE THROAT: 0
STRIDOR: 0
TROUBLE SWALLOWING: 0
APNEA: 0
EYE PAIN: 0
RESPIRATORY NEGATIVE: 1

## 2023-01-18 ASSESSMENT — SOCIAL DETERMINANTS OF HEALTH (SDOH): HOW HARD IS IT FOR YOU TO PAY FOR THE VERY BASICS LIKE FOOD, HOUSING, MEDICAL CARE, AND HEATING?: NOT VERY HARD

## 2023-01-18 NOTE — PROGRESS NOTES
Subjective:      Chief Complaint   Patient presents with    New Patient       Patient ID: Stanford Theodore is a 78 y.o. female who presents to [facility]. Transfer from Dr Alanna Walter. PMH: Hypertension, CVA, TIA, bilateral knee replacement, depression, migraines, ulcer, OA. She presents today primarily for her osteoarthritis. She has had bilateral knee replacement and has prosthetic devices in them. She notes that she has arthritic areas throughout her body especially hands. Uses diclofenac cream topically. Denies any cardiac history yet she has had TIAs and possible CVAs. Blood pressure stable today 124/84. She is on lisinopril. Non-smoker, she has been vaccinated against COVID and boosted twice. She is a very compliant patient. HPI see above    No family history on file. Social History     Socioeconomic History    Marital status:      Spouse name: Not on file    Number of children: Not on file    Years of education: Not on file    Highest education level: Not on file   Occupational History    Not on file   Tobacco Use    Smoking status: Former     Packs/day: 0.50     Years: 10.00     Pack years: 5.00     Types: Cigarettes     Quit date: 1977     Years since quittin.5    Smokeless tobacco: Never   Vaping Use    Vaping Use: Never used   Substance and Sexual Activity    Alcohol use: Not Currently    Drug use: No    Sexual activity: Not on file   Other Topics Concern    Not on file   Social History Narrative    Not on file     Social Determinants of Health     Financial Resource Strain: Low Risk     Difficulty of Paying Living Expenses: Not very hard   Food Insecurity: No Food Insecurity    Worried About Running Out of Food in the Last Year: Never true    [address] in the Last Year: Never true   Transportation Needs: No Transportation Needs    Lack of Transportation (Medical): No    Lack of Transportation (Non-Medical):  No   Physical Activity: Not on file   Stress: Not on file Social Connections: Not on file   Intimate Partner Violence: Not on file   Housing Stability: Unknown    Unable to Pay for Housing in the Last Year: No    Number of [place] in the Last Year: Not on file    Unstable Housing in the Last Year: No       Current Outpatient Medications on File Prior to Visit   Medication Sig Dispense Refill    calcium carbonate (OYSTER SHELL CALCIUM 500 MG) 1250 (500 Ca) MG tablet Take 1 tablet by mouth daily      Misc. Devices (COMMODE BEDSIDE) MISC 1 Device by Does not apply route as needed (bedside commode as needed) 1 each 0    acetaminophen (TYLENOL) 325 MG tablet Take 650 mg by mouth every 6 hours as needed for Pain      LORazepam (ATIVAN) 1 MG tablet Take 1 mg by mouth nightly as needed (sleep). Take 1 and 1/2 tabs at night for sleep      citalopram (CELEXA) 40 MG tablet Take 40 mg by mouth daily      Prenatal Vit-Fe Fumarate-FA (PRENATAL 1 PLUS 1 PO) Take by mouth      lisinopril (PRINIVIL;ZESTRIL) 20 MG tablet Take 20 mg by mouth daily      aspirin-acetaminophen-caffeine (EXCEDRIN MIGRAINE) 250-250-65 MG per tablet Take by mouth daily as needed      atorvastatin (LIPITOR) 40 MG tablet Take 1 tablet by mouth nightly (Patient not taking: Reported on 1/18/2023) 30 tablet 1     No current facility-administered medications on file prior to visit. Review of Systems   Constitutional: Negative. Negative for activity change, appetite change, chills, diaphoresis, fatigue, fever and unexpected weight change. HENT: Negative. Negative for congestion, dental problem, drooling, ear discharge, ear pain, facial swelling, hearing loss, mouth sores, nosebleeds, postnasal drip, rhinorrhea, sinus pressure, sinus pain, sneezing, sore throat, tinnitus, trouble swallowing and voice change. Eyes: Negative. Negative for photophobia, pain, discharge, redness, itching and visual disturbance. Respiratory: Negative.   Negative for apnea, cough, choking, chest tightness, shortness of breath, wheezing and stridor. Cardiovascular: Negative. Negative for chest pain, palpitations and leg swelling. Gastrointestinal: Negative. Negative for abdominal distention, abdominal pain, anal bleeding, blood in stool, constipation, diarrhea, nausea, rectal pain and vomiting. Endocrine: Negative. Negative for cold intolerance, heat intolerance, polydipsia, polyphagia and polyuria. Genitourinary: Negative. Negative for decreased urine volume, difficulty urinating, dyspareunia, dysuria, enuresis, flank pain, frequency, genital sores, hematuria, menstrual problem, pelvic pain, urgency, vaginal bleeding, vaginal discharge and vaginal pain. Musculoskeletal:  Positive for arthralgias and back pain. Negative for gait problem, joint swelling, myalgias, neck pain and neck stiffness. OA   Skin: Negative. Negative for color change, pallor, rash and wound. Allergic/Immunologic: Positive for environmental allergies (zyrtec). Negative for food allergies and immunocompromised state. Neurological: Negative. Negative for dizziness, tremors, seizures, syncope, facial asymmetry, speech difficulty, weakness, light-headedness, numbness and headaches. Hematological: Negative. Negative for adenopathy. Does not bruise/bleed easily. Psychiatric/Behavioral: Negative. Negative for agitation, behavioral problems, confusion, decreased concentration, dysphoric mood, hallucinations, self-injury, sleep disturbance and suicidal ideas. The patient is not nervous/anxious and is not hyperactive. Objective:     Physical Exam  Vitals reviewed. Constitutional:       General: She is not in acute distress. Appearance: Normal appearance. She is not ill-appearing, toxic-appearing or diaphoretic. HENT:      Head: Normocephalic and atraumatic. Right Ear: Tympanic membrane, ear canal and external ear normal. There is no impacted cerumen.       Left Ear: Tympanic membrane, ear canal and external ear normal. There is no impacted cerumen. Nose: Nose normal. No congestion or rhinorrhea. Mouth/Throat:      Mouth: Mucous membranes are moist.      Pharynx: Oropharynx is clear. No oropharyngeal exudate or posterior oropharyngeal erythema. Eyes:      General: No scleral icterus. Extraocular Movements: Extraocular movements intact. Conjunctiva/sclera: Conjunctivae normal.      Pupils: Pupils are equal, round, and reactive to light. Neck:      Vascular: No carotid bruit. Cardiovascular:      Rate and Rhythm: Normal rate and regular rhythm. Pulses: Normal pulses. Heart sounds: Normal heart sounds. No murmur heard. No friction rub. No gallop. Pulmonary:      Effort: Pulmonary effort is normal. No respiratory distress. Breath sounds: Normal breath sounds. No stridor. No wheezing, rhonchi or rales. Chest:      Chest wall: No tenderness. Abdominal:      General: Abdomen is flat. Bowel sounds are normal. There is no distension. Palpations: There is no mass. Tenderness: There is no abdominal tenderness. There is no right CVA tenderness, left CVA tenderness, guarding or rebound. Hernia: No hernia is present. Musculoskeletal:         General: No swelling, tenderness, deformity or signs of injury. Normal range of motion. Cervical back: Normal range of motion and neck supple. No rigidity or tenderness. Right lower leg: No edema. Left lower leg: No edema. Lymphadenopathy:      Cervical: No cervical adenopathy. Skin:     General: Skin is warm and dry. Capillary Refill: Capillary refill takes 2 to 3 seconds. Coloration: Skin is not jaundiced or pale. Findings: No bruising, erythema, lesion or rash. Neurological:      General: No focal deficit present. Mental Status: She is alert. She is disoriented. Cranial Nerves: No cranial nerve deficit. Sensory: No sensory deficit. Motor: No weakness.       Coordination: Coordination normal.      Gait: Gait normal.      Deep Tendon Reflexes: Reflexes normal.   Psychiatric:         Mood and Affect: Mood normal.         Behavior: Behavior normal.         Thought Content: Thought content normal.         Judgment: Judgment normal.       Assessment:   1. Mixed hyperlipidemia    - Comprehensive Metabolic Panel; Future  - CBC with Auto Differential; Future  - TSH with Reflex; Future    2. Primary osteoarthritis involving multiple joints  Patient would like Prolia I would first like to get a DEXA scan  - DEXA BONE DENSITY AXIAL SKELETON; Future    3. Age-related osteoporosis without current pathological fracture     - DEXA BONE DENSITY AXIAL SKELETON; Future    4. Encounter for medical examination to establish care  Data obtained labs ordered    5. Physical exam  Counseled on importance of healthy diet and regular exercise of at least 30 minutes on four or more days during the week. Counseled on skin safety, SPF 30 or higher prior to going outdoors and reapplication every two hours while outside.  Monitor moles for changes, report to provider if greater than 6 mm, color variations, asymmetry, redness, scales, and/or overlying skin changes  Counseled on safety, wear seatbelt, do not consume alcohol and drive or drive with anyone who has consumed alcohol  Counseled on safe sex practices, wear condoms, limit number of sexual partners  Counseled on importance of monthly self breast exams, perform on same time each month, monitor for new lumps/bumps/masses, tenderness, changes to size or contour, dimpling, nipple discharge, new nipple retraction, and overlying skin changes, report to provider      Plan:   As above  Will get DEXA scan before administering Prolia

## 2023-01-19 ENCOUNTER — TELEPHONE (OUTPATIENT)
Dept: FAMILY MEDICINE CLINIC | Age: 78
End: 2023-01-19

## 2023-01-19 NOTE — TELEPHONE ENCOUNTER
Fracnes Becerra with [facility] [address] needs clarification on Prolia quantity. No future appointments. Past appointment was 1/18/23.     Martha [address]

## 2023-01-20 DIAGNOSIS — E78.2 MIXED HYPERLIPIDEMIA: ICD-10-CM

## 2023-01-20 LAB
A/G RATIO: 2.1 (ref 1.1–2.2)
ALBUMIN SERPL-MCNC: 4.2 G/DL (ref 3.4–5)
ALP BLD-CCNC: 130 U/L (ref 40–129)
ALT SERPL-CCNC: 18 U/L (ref 10–40)
ANION GAP SERPL CALCULATED.3IONS-SCNC: 11 MMOL/L (ref 3–16)
AST SERPL-CCNC: 25 U/L (ref 15–37)
BASOPHILS ABSOLUTE: 0 K/UL (ref 0–0.2)
BASOPHILS RELATIVE PERCENT: 0.9 %
BILIRUB SERPL-MCNC: <0.2 MG/DL (ref 0–1)
BUN BLDV-MCNC: 19 MG/DL (ref 7–20)
CALCIUM SERPL-MCNC: 9.4 MG/DL (ref 8.3–10.6)
CHLORIDE BLD-SCNC: 107 MMOL/L (ref 99–110)
CHOLESTEROL, TOTAL: 216 MG/DL (ref 0–199)
CO2: 27 MMOL/L (ref 21–32)
CREAT SERPL-MCNC: 0.7 MG/DL (ref 0.6–1.2)
EOSINOPHILS ABSOLUTE: 0.1 K/UL (ref 0–0.6)
EOSINOPHILS RELATIVE PERCENT: 1.5 %
GFR SERPL CREATININE-BSD FRML MDRD: >60 ML/MIN/{1.73_M2}
GLUCOSE BLD-MCNC: 80 MG/DL (ref 70–99)
HCT VFR BLD CALC: 38.1 % (ref 36–48)
HDLC SERPL-MCNC: 97 MG/DL (ref 40–60)
HEMOGLOBIN: 12.4 G/DL (ref 12–16)
LDL CHOLESTEROL CALCULATED: 108 MG/DL
LYMPHOCYTES ABSOLUTE: 1.4 K/UL (ref 1–5.1)
LYMPHOCYTES RELATIVE PERCENT: 30.5 %
MCH RBC QN AUTO: 32.5 PG (ref 26–34)
MCHC RBC AUTO-ENTMCNC: 32.7 G/DL (ref 31–36)
MCV RBC AUTO: 99.5 FL (ref 80–100)
MONOCYTES ABSOLUTE: 0.4 K/UL (ref 0–1.3)
MONOCYTES RELATIVE PERCENT: 8.6 %
NEUTROPHILS ABSOLUTE: 2.8 K/UL (ref 1.7–7.7)
NEUTROPHILS RELATIVE PERCENT: 58.5 %
PDW BLD-RTO: 12.8 % (ref 12.4–15.4)
PLATELET # BLD: 167 K/UL (ref 135–450)
PMV BLD AUTO: 9.1 FL (ref 5–10.5)
POTASSIUM SERPL-SCNC: 3.9 MMOL/L (ref 3.5–5.1)
RBC # BLD: 3.83 M/UL (ref 4–5.2)
SODIUM BLD-SCNC: 145 MMOL/L (ref 136–145)
TOTAL PROTEIN: 6.2 G/DL (ref 6.4–8.2)
TRIGL SERPL-MCNC: 57 MG/DL (ref 0–150)
TSH REFLEX: 2 UIU/ML (ref 0.27–4.2)
VLDLC SERPL CALC-MCNC: 11 MG/DL
WBC # BLD: 4.7 K/UL (ref 4–11)

## 2023-01-23 NOTE — PATIENT INSTRUCTIONS
OA per patient's report, DEXA scan ordered.   We will order Prolia after report from DEXA scan obtained

## 2023-01-24 ENCOUNTER — TELEPHONE (OUTPATIENT)
Dept: FAMILY MEDICINE CLINIC | Age: 78
End: 2023-01-24

## 2023-01-24 NOTE — TELEPHONE ENCOUNTER
[facility] called and they are requesting clarification  on the quantity for pt's denosumab (PROLIA) 60 MG/ML SOSY SC injection  Dispense Quantity: 180 mL, they only come in 1 ml syringes please clarify quantity. Can a covering provider clarify?

## 2023-01-25 ENCOUNTER — TELEPHONE (OUTPATIENT)
Dept: FAMILY MEDICINE CLINIC | Age: 78
End: 2023-01-25

## 2023-01-25 DIAGNOSIS — F41.9 ANXIETY: Primary | ICD-10-CM

## 2023-01-25 NOTE — TELEPHONE ENCOUNTER
Dwight Chavez with [employer] called about the Prolia Rx that was sent on 1/24 with the updated quantity, however they did not receive a frequency on it. It needs to have every 6 months on it in order to bill the insurance.      Please correct and resend

## 2023-01-25 NOTE — TELEPHONE ENCOUNTER
Elvia Yeboah, APRN - CNP   1/25/2023 10:39 AM EST       Please let pt know results; electrolytes, kidneys and liver are normal.  Total cholesterol is 216 it should be below 200  HDL or good cholesterol of 97 that should be above 40  LDL or bad cholesterol is 108 that should be below 100 and her triglycerides are 57 they should be below 150. Overall your cholesterol level is fairly good minor adjustments in your diet and exercise but improve the LDL and therefore the total but otherwise fairly good panel  Thyroid and blood counts were normal   Patient called and was given results. Is she to re-start the atorvastatin?

## 2023-01-27 ENCOUNTER — TELEPHONE (OUTPATIENT)
Dept: FAMILY MEDICINE CLINIC | Age: 78
End: 2023-01-27

## 2023-01-27 DIAGNOSIS — F41.9 ANXIETY: ICD-10-CM

## 2023-01-27 RX ORDER — LORAZEPAM 1 MG/1
1 TABLET ORAL NIGHTLY PRN
Qty: 45 TABLET | Refills: 0 | Status: SHIPPED | OUTPATIENT
Start: 2023-01-27 | End: 2023-02-26

## 2023-01-27 RX ORDER — LORAZEPAM 1 MG/1
1 TABLET ORAL NIGHTLY PRN
Qty: 30 TABLET | Refills: 0 | Status: SHIPPED | OUTPATIENT
Start: 2023-01-27 | End: 2023-01-27 | Stop reason: SDUPTHER

## 2023-01-27 NOTE — TELEPHONE ENCOUNTER
[employer] called about the Ativan rx. They need to know which set of directions is correct. Please advise or send new rx.

## 2023-01-30 ENCOUNTER — TELEPHONE (OUTPATIENT)
Dept: FAMILY MEDICINE CLINIC | Age: 78
End: 2023-01-30

## 2023-01-30 NOTE — TELEPHONE ENCOUNTER
Here is the sig, please clarify. Thank you    Sig: Take 1 tablet by mouth nightly as needed (sleep) for up to 30 days.  Take 1 and 1/2 tabs at night for sleep Max Daily Amount: 1 mg

## 2023-01-30 NOTE — TELEPHONE ENCOUNTER
LORazepam (ATIVAN) 1 MG tablet   Came with 2 sets of directions need to clarify which set of directions need to be followed

## 2023-01-30 NOTE — TELEPHONE ENCOUNTER
Spoke with Tri Manzo and clarified that pt should take 1 to 1 1/2 prn for sleep. Quantity 45     Called pharmacy and gave clarification to pharmacy.

## 2023-02-07 ENCOUNTER — TELEPHONE (OUTPATIENT)
Dept: FAMILY MEDICINE CLINIC | Age: 78
End: 2023-02-07

## 2023-02-07 DIAGNOSIS — F41.9 ANXIETY: ICD-10-CM

## 2023-02-07 NOTE — TELEPHONE ENCOUNTER
LVMTCB and clarify if this request was requested by pt. New request is for [facility] pharmacy please clarify? Form scanned into this encounter.

## 2023-02-07 NOTE — TELEPHONE ENCOUNTER
1/18/2023     Future Appointments   Date Time Provider Helen Arriaga   4/17/2023  1:00 PM [address] [place] [address]

## 2023-02-09 RX ORDER — LISINOPRIL 20 MG/1
20 TABLET ORAL DAILY
Qty: 90 TABLET | Refills: 1 | OUTPATIENT
Start: 2023-02-09

## 2023-02-09 RX ORDER — CITALOPRAM 40 MG/1
40 TABLET ORAL DAILY
Qty: 90 TABLET | Refills: 1 | OUTPATIENT
Start: 2023-02-09

## 2023-02-09 RX ORDER — LORAZEPAM 1 MG/1
1 TABLET ORAL NIGHTLY PRN
Qty: 45 TABLET | Refills: 0 | Status: CANCELLED | OUTPATIENT
Start: 2023-02-09 | End: 2023-03-11

## 2023-02-09 NOTE — TELEPHONE ENCOUNTER
[employer] rep called for patient. She was checking on status of medication listed to go to [place] [id number]. Informed her that medication request has been sent to provider.     Future Appointments   Date Time Provider Helen Arriaga   4/17/2023  1:00 PM XIMENA Gomes - NNEKA UGALDE     Last ov appointment was 1/18/23

## 2023-02-10 DIAGNOSIS — F41.9 ANXIETY: ICD-10-CM

## 2023-02-10 RX ORDER — LORAZEPAM 1 MG/1
1 TABLET ORAL NIGHTLY PRN
Qty: 45 TABLET | Refills: 0 | Status: SHIPPED | OUTPATIENT
Start: 2023-02-10 | End: 2023-03-12

## 2023-02-15 DIAGNOSIS — F41.9 ANXIETY: ICD-10-CM

## 2023-02-15 RX ORDER — LORAZEPAM 1 MG/1
1.5 TABLET ORAL NIGHTLY PRN
Qty: 45 TABLET | Refills: 0 | Status: CANCELLED | OUTPATIENT
Start: 2023-02-15 | End: 2023-03-17

## 2023-02-15 NOTE — TELEPHONE ENCOUNTER
Pharmacy having issues with multiple Sig on script can you change and resend as it won't let me take out the extra notes

## 2023-02-20 ENCOUNTER — TELEPHONE (OUTPATIENT)
Dept: FAMILY MEDICINE CLINIC | Age: 78
End: 2023-02-20

## 2023-02-20 DIAGNOSIS — F41.9 ANXIETY: Primary | ICD-10-CM

## 2023-02-20 RX ORDER — LORAZEPAM 1 MG/1
1 TABLET ORAL EVERY 8 HOURS PRN
Qty: 90 TABLET | Refills: 0 | Status: SHIPPED | OUTPATIENT
Start: 2023-02-20 | End: 2023-03-22

## 2023-02-20 NOTE — TELEPHONE ENCOUNTER
[facility] pharmacy needs clarification on the pt's LORazepam direction   The RX states Take 1 tablet by mouth nightly as needed (sleep) for up to 30 days. Take 1 and 1/2 tabs at night for sleep Max Daily Amount: 1 mg.  Please clarify the RX  the pharmacy number is [phone]

## 2023-03-03 ENCOUNTER — TELEMEDICINE (OUTPATIENT)
Dept: FAMILY MEDICINE CLINIC | Age: 78
End: 2023-03-03
Payer: MEDICARE

## 2023-03-03 DIAGNOSIS — R19.7 DIARRHEA, UNSPECIFIED TYPE: ICD-10-CM

## 2023-03-03 DIAGNOSIS — R05.1 ACUTE COUGH: ICD-10-CM

## 2023-03-03 DIAGNOSIS — J34.89 PURULENT NASAL DISCHARGE: Primary | ICD-10-CM

## 2023-03-03 DIAGNOSIS — H92.03 ACUTE EAR PAIN, BILATERAL: ICD-10-CM

## 2023-03-03 PROCEDURE — 99441 PR PHYS/QHP TELEPHONE EVALUATION 5-10 MIN: CPT | Performed by: FAMILY MEDICINE

## 2023-03-03 RX ORDER — FLUTICASONE PROPIONATE 50 MCG
1 SPRAY, SUSPENSION (ML) NASAL DAILY
Qty: 1 EACH | Refills: 0 | Status: SHIPPED | OUTPATIENT
Start: 2023-03-03 | End: 2023-03-10

## 2023-03-03 RX ORDER — AMOXICILLIN AND CLAVULANATE POTASSIUM 875; 125 MG/1; MG/1
1 TABLET, FILM COATED ORAL 2 TIMES DAILY
Qty: 14 TABLET | Refills: 0 | Status: SHIPPED | OUTPATIENT
Start: 2023-03-03 | End: 2023-03-10

## 2023-03-03 RX ORDER — LOPERAMIDE HYDROCHLORIDE 2 MG/1
2 CAPSULE ORAL 4 TIMES DAILY PRN
Qty: 12 CAPSULE | Refills: 0 | Status: SHIPPED | OUTPATIENT
Start: 2023-03-03 | End: 2023-03-06

## 2023-03-03 RX ORDER — BENZONATATE 100 MG/1
100 CAPSULE ORAL 3 TIMES DAILY PRN
Qty: 30 CAPSULE | Refills: 1 | Status: SHIPPED | OUTPATIENT
Start: 2023-03-03 | End: 2023-03-13

## 2023-04-18 RX ORDER — DICLOFENAC SODIUM 75 MG/1
75 TABLET, DELAYED RELEASE ORAL 2 TIMES DAILY
Qty: 60 TABLET | Refills: 1 | Status: SHIPPED | OUTPATIENT
Start: 2023-04-18 | End: 2023-04-19 | Stop reason: ALTCHOICE

## 2023-04-18 RX ORDER — DICLOFENAC SODIUM 75 MG/1
75 TABLET, DELAYED RELEASE ORAL 2 TIMES DAILY
Qty: 60 TABLET | Refills: 1 | Status: SHIPPED | OUTPATIENT
Start: 2023-04-18 | End: 2023-04-18 | Stop reason: SDUPTHER

## 2023-04-19 ENCOUNTER — OFFICE VISIT (OUTPATIENT)
Dept: FAMILY MEDICINE CLINIC | Age: 78
End: 2023-04-19
Payer: MEDICARE

## 2023-04-19 VITALS
HEART RATE: 118 BPM | RESPIRATION RATE: 18 BRPM | SYSTOLIC BLOOD PRESSURE: 122 MMHG | WEIGHT: 119 LBS | BODY MASS INDEX: 24.45 KG/M2 | OXYGEN SATURATION: 99 % | DIASTOLIC BLOOD PRESSURE: 84 MMHG

## 2023-04-19 DIAGNOSIS — M25.561 BILATERAL CHRONIC KNEE PAIN: ICD-10-CM

## 2023-04-19 DIAGNOSIS — F41.9 ANXIETY: ICD-10-CM

## 2023-04-19 DIAGNOSIS — G89.29 BILATERAL CHRONIC KNEE PAIN: ICD-10-CM

## 2023-04-19 DIAGNOSIS — Z23 NEED FOR SHINGLES VACCINE: ICD-10-CM

## 2023-04-19 DIAGNOSIS — M19.91 PRIMARY OSTEOARTHRITIS, UNSPECIFIED SITE: Primary | ICD-10-CM

## 2023-04-19 DIAGNOSIS — M25.562 BILATERAL CHRONIC KNEE PAIN: ICD-10-CM

## 2023-04-19 PROCEDURE — 1036F TOBACCO NON-USER: CPT | Performed by: NURSE PRACTITIONER

## 2023-04-19 PROCEDURE — G8420 CALC BMI NORM PARAMETERS: HCPCS | Performed by: NURSE PRACTITIONER

## 2023-04-19 PROCEDURE — G8400 PT W/DXA NO RESULTS DOC: HCPCS | Performed by: NURSE PRACTITIONER

## 2023-04-19 PROCEDURE — 99213 OFFICE O/P EST LOW 20 MIN: CPT | Performed by: NURSE PRACTITIONER

## 2023-04-19 PROCEDURE — 1090F PRES/ABSN URINE INCON ASSESS: CPT | Performed by: NURSE PRACTITIONER

## 2023-04-19 PROCEDURE — G8427 DOCREV CUR MEDS BY ELIG CLIN: HCPCS | Performed by: NURSE PRACTITIONER

## 2023-04-19 PROCEDURE — 1123F ACP DISCUSS/DSCN MKR DOCD: CPT | Performed by: NURSE PRACTITIONER

## 2023-04-19 RX ORDER — COVID-19 ANTIGEN TEST
KIT MISCELLANEOUS
COMMUNITY
Start: 2023-03-03

## 2023-04-19 RX ORDER — BENZONATATE 100 MG/1
CAPSULE ORAL
COMMUNITY
Start: 2023-04-12 | End: 2023-04-19

## 2023-04-19 RX ORDER — HYDROCODONE BITARTRATE AND ACETAMINOPHEN 5; 325 MG/1; MG/1
1 TABLET ORAL EVERY 6 HOURS PRN
Qty: 10 TABLET | Refills: 0 | Status: SHIPPED | OUTPATIENT
Start: 2023-04-19 | End: 2023-04-22

## 2023-04-19 RX ORDER — ZOSTER VACCINE RECOMBINANT, ADJUVANTED 50 MCG/0.5
0.5 KIT INTRAMUSCULAR SEE ADMIN INSTRUCTIONS
Qty: 0.5 ML | Refills: 0 | Status: SHIPPED | OUTPATIENT
Start: 2023-04-19 | End: 2023-10-16

## 2023-04-19 RX ORDER — LOPERAMIDE HYDROCHLORIDE 2 MG/1
CAPSULE ORAL
COMMUNITY
Start: 2023-04-12

## 2023-04-19 RX ORDER — ETODOLAC 400 MG/1
400 TABLET, FILM COATED ORAL 2 TIMES DAILY
Qty: 60 TABLET | Refills: 1 | Status: SHIPPED | OUTPATIENT
Start: 2023-04-19 | End: 2024-04-18

## 2023-04-19 ASSESSMENT — ENCOUNTER SYMPTOMS
ABDOMINAL PAIN: 1
BACK PAIN: 1
RESPIRATORY NEGATIVE: 1

## 2023-04-19 NOTE — PROGRESS NOTES
tablet by mouth every 6 hours as needed for Pain for up to 3 days. Intended supply: 3 days. Take lowest dose possible to manage pain Max Daily Amount: 4 tablets     Dispense:  10 tablet     Refill:  0     Reduce doses taken as pain becomes manageable    zoster recombinant adjuvanted vaccine [facility]) 50 MCG/0.5ML SUSR injection     Sig: Inject 0.5 mLs into the muscle See Admin Instructions 1 dose now and repeat in 2-6 months     Dispense:  0.5 mL     Refill:  0       Patient Education:  plan    Return in about 4 weeks (around 5/17/2023).

## 2023-04-19 NOTE — PATIENT INSTRUCTIONS
stop diclofenac and tylenol  Start lodine daily. Take with food  Continue back stretches/ heat and ice  Pain pills ans needed. No refills.

## 2023-04-20 ENCOUNTER — TELEPHONE (OUTPATIENT)
Dept: FAMILY MEDICINE CLINIC | Age: 78
End: 2023-04-20

## 2023-04-20 RX ORDER — CITALOPRAM 40 MG/1
40 TABLET ORAL DAILY
Qty: 30 TABLET | Refills: 2 | Status: SHIPPED | OUTPATIENT
Start: 2023-04-20

## 2023-04-20 NOTE — TELEPHONE ENCOUNTER
Patient called to state that when she was in the office to see Selin Little yesterday, she forgot to ask for a refill of the following to be sent to [facility] in [place] on file. She's going out of town on Monday and needs this before then.      citalopram (CELEXA) 40 MG tablet

## 2023-04-23 RX ORDER — LORAZEPAM 1 MG/1
TABLET ORAL
Qty: 90 TABLET | Refills: 0 | Status: SHIPPED | OUTPATIENT
Start: 2023-04-23 | End: 2023-05-23

## 2023-05-11 ENCOUNTER — OFFICE VISIT (OUTPATIENT)
Dept: FAMILY MEDICINE CLINIC | Age: 78
End: 2023-05-11
Payer: MEDICARE

## 2023-05-11 VITALS
DIASTOLIC BLOOD PRESSURE: 80 MMHG | BODY MASS INDEX: 25.06 KG/M2 | WEIGHT: 122 LBS | RESPIRATION RATE: 18 BRPM | SYSTOLIC BLOOD PRESSURE: 118 MMHG | HEART RATE: 75 BPM | OXYGEN SATURATION: 98 %

## 2023-05-11 DIAGNOSIS — N30.01 ACUTE CYSTITIS WITH HEMATURIA: Primary | ICD-10-CM

## 2023-05-11 LAB
BILIRUBIN, POC: NORMAL
BLOOD URINE, POC: NEGATIVE
CLARITY, POC: NORMAL
COLOR, POC: NORMAL
GLUCOSE URINE, POC: NORMAL
KETONES, POC: NORMAL
LEUKOCYTE EST, POC: NORMAL
NITRITE, POC: POSITIVE
PH, POC: 5
PROTEIN, POC: NORMAL
SPECIFIC GRAVITY, POC: 1.01
UROBILINOGEN, POC: NORMAL

## 2023-05-11 PROCEDURE — G8427 DOCREV CUR MEDS BY ELIG CLIN: HCPCS | Performed by: NURSE PRACTITIONER

## 2023-05-11 PROCEDURE — 1123F ACP DISCUSS/DSCN MKR DOCD: CPT | Performed by: NURSE PRACTITIONER

## 2023-05-11 PROCEDURE — G8419 CALC BMI OUT NRM PARAM NOF/U: HCPCS | Performed by: NURSE PRACTITIONER

## 2023-05-11 PROCEDURE — 81003 URINALYSIS AUTO W/O SCOPE: CPT | Performed by: NURSE PRACTITIONER

## 2023-05-11 PROCEDURE — 1036F TOBACCO NON-USER: CPT | Performed by: NURSE PRACTITIONER

## 2023-05-11 PROCEDURE — G8400 PT W/DXA NO RESULTS DOC: HCPCS | Performed by: NURSE PRACTITIONER

## 2023-05-11 PROCEDURE — 99213 OFFICE O/P EST LOW 20 MIN: CPT | Performed by: NURSE PRACTITIONER

## 2023-05-11 PROCEDURE — 1090F PRES/ABSN URINE INCON ASSESS: CPT | Performed by: NURSE PRACTITIONER

## 2023-05-11 RX ORDER — CIPROFLOXACIN 500 MG/1
500 TABLET, FILM COATED ORAL 2 TIMES DAILY
Qty: 6 TABLET | Refills: 0 | Status: SHIPPED | OUTPATIENT
Start: 2023-05-11 | End: 2023-05-14

## 2023-05-11 ASSESSMENT — ENCOUNTER SYMPTOMS
SHORTNESS OF BREATH: 0
CHEST TIGHTNESS: 0
WHEEZING: 0
COUGH: 0
ABDOMINAL PAIN: 0

## 2023-05-11 NOTE — PROGRESS NOTES
5/11/2023    This is a 70 y.o. female   Chief Complaint   Patient presents with    Cystitis     States she just came back from a cruise. Last two to three days, she started with Pressure and discomfort. Thinks she has a bladder infection. Has been taking OTC medication. Is not helping   . Dixon [place] is seen today for UTi symptoms that began about 3-4 days ago. She notes urinary frequency, dysuria and pelvic pressure. She has been trying to push fluids and took over-the-counter Pyridium which has changed the color of her urine but has not made much improvement of her symptoms. She denies any fevers or chills. 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)    Stroke determined by clinical assessment ([facility] [address]    TIA (transient ischemic attack)    Hypotension       Current Outpatient Medications   Medication Sig Dispense Refill    denosumab (PROLIA) 60 MG/ML SOSY SC injection Inject 1 mL into the skin once for 1 dose 1 mL 0    LORazepam (ATIVAN) 1 MG tablet TAKE 1 TABLET BY MOUTH EVERY 8 HOURS AS NEEDED FOR ANXIETY FOR UP TO 30 DAYS (MAX DAILY AMOUNT: 3 MG) 90 tablet 0    LORazepam (ATIVAN) 1 MG tablet TAKE 1 AND 1/2 TABLET BY MOUTH ONCE NIGHTLY AS NEEDED FOR SLEEP 45 tablet 0    diclofenac (VOLTAREN) 75 MG EC tablet TAKE ONE TABLET BY MOUTH TWICE A DAY 60 tablet 0    citalopram (CELEXA) 40 MG tablet Take 1 tablet by mouth daily 30 tablet 2    loperamide (IMODIUM) 2 MG capsule       FLOWFLEX COVID-19 AG HOME TEST KIT USE AS DIRECTED ON PACKAGE      etodolac (LODINE) 400 MG tablet Take 1 tablet by mouth 2 times daily 60 tablet 1    aspirin-acetaminophen-caffeine (EXCEDRIN MIGRAINE) 250-250-65 MG per tablet Take by mouth daily as needed      calcium carbonate (OYSTER SHELL CALCIUM 500 MG) 1250 (500 Ca) MG tablet Take 1 tablet by mouth daily      Misc.  Devices (COMMODE BEDSIDE)

## 2023-05-13 LAB
BACTERIA UR CULT: ABNORMAL
BACTERIA UR CULT: ABNORMAL
ORGANISM: ABNORMAL

## 2023-05-17 ENCOUNTER — TELEPHONE (OUTPATIENT)
Dept: FAMILY MEDICINE CLINIC | Age: 78
End: 2023-05-17

## 2023-05-17 DIAGNOSIS — N30.01 ACUTE CYSTITIS WITH HEMATURIA: Primary | ICD-10-CM

## 2023-05-17 RX ORDER — NITROFURANTOIN 25; 75 MG/1; MG/1
100 CAPSULE ORAL 2 TIMES DAILY
Qty: 20 CAPSULE | Refills: 0 | Status: SHIPPED | OUTPATIENT
Start: 2023-05-17 | End: 2023-05-27

## 2023-05-17 RX ORDER — CIPROFLOXACIN 500 MG/1
500 TABLET, FILM COATED ORAL 2 TIMES DAILY
Qty: 10 TABLET | Refills: 0 | Status: SHIPPED
Start: 2023-05-17 | End: 2023-05-17 | Stop reason: ALTCHOICE

## 2023-05-17 NOTE — PROGRESS NOTES
She continues to have uti symptoms that have not fully resolved.  Additional days of antibiotics called into pharmacy and she will repeat culture at the end of the antibiotics to ensure clearing

## 2023-05-17 NOTE — TELEPHONE ENCOUNTER
Pt called to find out what medication was being sent in for her. I advised that Cipro was sent in. Pt states this medication gave her the following side effects: legs shaking & burning, diarrhea, frequent urination, and pressure. She is asking that a different antibiotic be called in. Please advise.

## 2023-05-17 NOTE — TELEPHONE ENCOUNTER
Please call the pharmacy and cancel the Cipro and let the patient know that I have sent in [facility] [address]. Please let her know that the urinary frequency, burning, diarrhea, and pressure  are likely being caused by the E. coli infection and not necessarily the antibiotic.

## 2023-05-19 NOTE — PROGRESS NOTES
Can discuss at upcoming mwv   Future Appointments   Date Time Provider Helen Arriaga   5/24/2023  1:20 PM XIMENA Falcon - NNEKA UGALDE

## 2023-06-07 ENCOUNTER — OFFICE VISIT (OUTPATIENT)
Dept: FAMILY MEDICINE CLINIC | Age: 78
End: 2023-06-07
Payer: MEDICARE

## 2023-06-07 VITALS
WEIGHT: 124 LBS | BODY MASS INDEX: 26.03 KG/M2 | RESPIRATION RATE: 16 BRPM | HEIGHT: 58 IN | HEART RATE: 74 BPM | TEMPERATURE: 97.3 F | OXYGEN SATURATION: 96 % | SYSTOLIC BLOOD PRESSURE: 120 MMHG | DIASTOLIC BLOOD PRESSURE: 82 MMHG

## 2023-06-07 DIAGNOSIS — M79.604 PAIN IN BOTH LOWER EXTREMITIES: ICD-10-CM

## 2023-06-07 DIAGNOSIS — Z00.00 INITIAL MEDICARE ANNUAL WELLNESS VISIT: Primary | ICD-10-CM

## 2023-06-07 DIAGNOSIS — M54.40 LOW BACK PAIN WITH SCIATICA, SCIATICA LATERALITY UNSPECIFIED, UNSPECIFIED BACK PAIN LATERALITY, UNSPECIFIED CHRONICITY: Primary | ICD-10-CM

## 2023-06-07 DIAGNOSIS — M79.605 PAIN IN BOTH LOWER EXTREMITIES: ICD-10-CM

## 2023-06-07 DIAGNOSIS — N30.00 ACUTE CYSTITIS WITHOUT HEMATURIA: ICD-10-CM

## 2023-06-07 LAB
BILIRUBIN, POC: NEGATIVE
BLOOD URINE, POC: NEGATIVE
CLARITY, POC: CLEAR
COLOR, POC: NORMAL
GLUCOSE URINE, POC: NEGATIVE
KETONES, POC: NEGATIVE
LEUKOCYTE EST, POC: NEGATIVE
NITRITE, POC: NEGATIVE
PH, POC: 6
PROTEIN, POC: NEGATIVE
SPECIFIC GRAVITY, POC: <=1.005
UROBILINOGEN, POC: NORMAL

## 2023-06-07 PROCEDURE — G0438 PPPS, INITIAL VISIT: HCPCS | Performed by: NURSE PRACTITIONER

## 2023-06-07 PROCEDURE — 1123F ACP DISCUSS/DSCN MKR DOCD: CPT | Performed by: NURSE PRACTITIONER

## 2023-06-07 PROCEDURE — 81003 URINALYSIS AUTO W/O SCOPE: CPT | Performed by: NURSE PRACTITIONER

## 2023-06-07 RX ORDER — CITALOPRAM 40 MG/1
40 TABLET ORAL DAILY
Qty: 30 TABLET | Refills: 2 | Status: SHIPPED | OUTPATIENT
Start: 2023-06-07

## 2023-06-07 ASSESSMENT — PATIENT HEALTH QUESTIONNAIRE - PHQ9
SUM OF ALL RESPONSES TO PHQ QUESTIONS 1-9: 0
1. LITTLE INTEREST OR PLEASURE IN DOING THINGS: 0
SUM OF ALL RESPONSES TO PHQ9 QUESTIONS 1 & 2: 0
SUM OF ALL RESPONSES TO PHQ QUESTIONS 1-9: 0
2. FEELING DOWN, DEPRESSED OR HOPELESS: 0

## 2023-06-07 NOTE — PROGRESS NOTES
Ca) MG tablet Take 1 tablet by mouth daily  Patient not taking: Reported on 6/7/2023  Historical Provider, MD Shafer (Including outside providers/suppliers regularly involved in providing care):   Patient Care Team:  XIMENA Garza CNP as PCP - General (Nurse Practitioner Adult Health)  XIMENA Garza CNP as PCP - Empaneled Provider     Reviewed and updated this visit:  Tobacco  Allergies  Meds  Problems  Med Hx  Surg Hx  Soc Hx  Fam Hx

## 2023-06-08 ENCOUNTER — HOSPITAL ENCOUNTER (OUTPATIENT)
Dept: VASCULAR LAB | Age: 78
Discharge: HOME | End: 2023-06-08
Payer: MEDICARE

## 2023-06-08 DIAGNOSIS — M79.605 PAIN IN BOTH LOWER EXTREMITIES: ICD-10-CM

## 2023-06-08 DIAGNOSIS — M79.604 PAIN IN BOTH LOWER EXTREMITIES: ICD-10-CM

## 2023-06-08 PROCEDURE — 93970 EXTREMITY STUDY: CPT

## 2023-06-09 ENCOUNTER — TELEPHONE (OUTPATIENT)
Dept: FAMILY MEDICINE CLINIC | Age: 78
End: 2023-06-09

## 2023-06-26 RX ORDER — LISINOPRIL 20 MG/1
20 TABLET ORAL DAILY
Qty: 90 TABLET | Refills: 2 | Status: SHIPPED | OUTPATIENT
Start: 2023-06-26

## 2023-08-02 DIAGNOSIS — F41.9 ANXIETY: ICD-10-CM

## 2023-08-02 NOTE — TELEPHONE ENCOUNTER
Pt called because she is needing this refill sent ASAP she only has 6-7 pills left. She wants to use [employer] because it would not cost her anything. Please advise.

## 2023-08-04 RX ORDER — LORAZEPAM 1 MG/1
TABLET ORAL
Qty: 90 TABLET | Refills: 0 | Status: SHIPPED | OUTPATIENT
Start: 2023-08-04 | End: 2023-09-03

## 2023-08-10 PROBLEM — G89.29 CHRONIC PAIN OF BOTH KNEES: Status: ACTIVE | Noted: 2023-08-10

## 2023-08-10 PROBLEM — M51.369 LUMBAR DEGENERATIVE DISC DISEASE: Status: ACTIVE | Noted: 2023-08-10

## 2023-08-10 PROBLEM — M25.561 CHRONIC PAIN OF BOTH KNEES: Status: ACTIVE | Noted: 2023-08-10

## 2023-08-10 PROBLEM — M51.36 LUMBAR DEGENERATIVE DISC DISEASE: Status: ACTIVE | Noted: 2023-08-10

## 2023-08-10 PROBLEM — M25.562 CHRONIC PAIN OF BOTH KNEES: Status: ACTIVE | Noted: 2023-08-10

## 2023-08-16 RX ORDER — CITALOPRAM 40 MG/1
TABLET ORAL
Qty: 90 TABLET | Refills: 2 | Status: SHIPPED | OUTPATIENT
Start: 2023-08-16

## 2023-09-25 ENCOUNTER — TELEPHONE (OUTPATIENT)
Dept: FAMILY MEDICINE CLINIC | Age: 78
End: 2023-09-25

## 2023-09-25 NOTE — TELEPHONE ENCOUNTER
[address] [place] [address] called to request that the refill for the following be sent to [facility] because the patient is out.      LORazepam (ATIVAN) 1 MG tablet     6/7/2023 last ov    Future Appointments   Date Time Provider [address]   11/7/2023  3:00 PM XIMENA Farley - CNP [facility]

## 2023-09-27 ENCOUNTER — NURSE TRIAGE (OUTPATIENT)
Dept: OTHER | Facility: CLINIC | Age: 78
End: 2023-09-27

## 2023-09-27 ENCOUNTER — NURSE ONLY (OUTPATIENT)
Dept: FAMILY MEDICINE CLINIC | Age: 78
End: 2023-09-27
Payer: MEDICARE

## 2023-09-27 DIAGNOSIS — N30.00 ACUTE CYSTITIS WITHOUT HEMATURIA: Primary | ICD-10-CM

## 2023-09-27 DIAGNOSIS — F41.9 ANXIETY: ICD-10-CM

## 2023-09-27 LAB
BILIRUBIN, POC: NORMAL
BLOOD URINE, POC: NORMAL
CLARITY, POC: CLEAR
COLOR, POC: YELLOW
GLUCOSE URINE, POC: NORMAL
KETONES, POC: NORMAL
LEUKOCYTE EST, POC: NORMAL
NITRITE, POC: POSITIVE
PH, POC: 5
PROTEIN, POC: NORMAL
SPECIFIC GRAVITY, POC: 1.02
UROBILINOGEN, POC: 0.2

## 2023-09-27 PROCEDURE — 81002 URINALYSIS NONAUTO W/O SCOPE: CPT | Performed by: NURSE PRACTITIONER

## 2023-09-27 RX ORDER — CIPROFLOXACIN 500 MG/1
500 TABLET, FILM COATED ORAL 2 TIMES DAILY
Qty: 14 TABLET | Refills: 0 | Status: SHIPPED | OUTPATIENT
Start: 2023-09-27 | End: 2023-10-04

## 2023-09-27 RX ORDER — LORAZEPAM 1 MG/1
TABLET ORAL
Qty: 90 TABLET | Refills: 0 | Status: CANCELLED | OUTPATIENT
Start: 2023-09-27 | End: 2023-10-26

## 2023-09-27 NOTE — TELEPHONE ENCOUNTER
Future Appointments   Date Time Provider [address]   11/7/2023  3:00 PM XIMENA Grider CNP [facility]     LOV 6/7/2023

## 2023-09-27 NOTE — TELEPHONE ENCOUNTER
Late for appointment-able to get a urine to dip. It was positive for nitrates and leukocytes. States she had been wearing a depends and apparently had an \"accident\" in the depends and was not able to change it. Apparently some of the fecal matter may have gotten up into the urethra to give her a bacteria of E. coli. In the past.  Assume this is what happened again.   I will give her Cipro which has a higher sensitivity than Macrobid and request she come in for urine dip once the antibiotics are completed

## 2023-09-28 NOTE — TELEPHONE ENCOUNTER
Please call and tell Dragan Almendarez is aware of the meds being taken and is monitoring patient and use ok at this time

## 2023-09-28 NOTE — TELEPHONE ENCOUNTER
Patient called to state Hawa Angel has a refill for her, but it needs authorization from her PCP. I reached out to Hawa Angel, who stated there is a contraindication to taking the Lorazepam with the oxycodone that Dr. Klever Deng prescribes. They just need verbal clarification that Fatemeh Lizeth is ok to have this filled for patient. Please call [facility] to clarify. The patient has been out for a few days and is not feeling well. Is there a provider that is able to address this in Jazmine's absence?

## 2023-09-28 NOTE — TELEPHONE ENCOUNTER
Patient called again to see if this has been taken care of. She states, \"She doesn't know what she is going to do. \" She is really not feeling well.

## 2023-09-29 LAB
BACTERIA UR CULT: ABNORMAL
ORGANISM: ABNORMAL

## 2023-11-06 ENCOUNTER — TELEPHONE (OUTPATIENT)
Dept: FAMILY MEDICINE CLINIC | Age: 78
End: 2023-11-06

## 2023-11-06 NOTE — TELEPHONE ENCOUNTER
----- Message from Romy Mendoza sent at 11/6/2023 12:03 PM EST -----  Subject: Message to Provider    QUESTIONS  Information for Provider? Pt is calling in to see when she is due to come   back into the office. Please advise.   ---------------------------------------------------------------------------  --------------  Severiano MACEDO  [phone]; OK to leave message on voicemail  ---------------------------------------------------------------------------  --------------  SCRIPT ANSWERS  Relationship to Patient?  Self

## 2023-11-07 NOTE — TELEPHONE ENCOUNTER
6/7/2023  Pt may not be due for an appointment HTN f/p in January. Please help her schedule when call is returned. I did leave it on her VM.

## 2023-11-17 DIAGNOSIS — F41.9 ANXIETY: ICD-10-CM

## 2023-11-17 RX ORDER — LORAZEPAM 1 MG/1
TABLET ORAL
Qty: 90 TABLET | OUTPATIENT
Start: 2023-11-17 | End: 2023-12-17

## 2023-11-17 RX ORDER — LORAZEPAM 1 MG/1
1 TABLET ORAL NIGHTLY PRN
Qty: 30 TABLET | Refills: 0 | Status: SHIPPED | OUTPATIENT
Start: 2023-11-17 | End: 2023-11-17 | Stop reason: DRUGHIGH

## 2023-11-17 RX ORDER — LORAZEPAM 1 MG/1
1.5 TABLET ORAL NIGHTLY
Qty: 30 TABLET | Refills: 0 | Status: SHIPPED | OUTPATIENT
Start: 2023-11-17 | End: 2023-11-20 | Stop reason: SDUPTHER

## 2023-11-17 NOTE — TELEPHONE ENCOUNTER
She will need an appointment with me prior to the refill since she needs a medication contract and urine drug screen. She also has not been seen in almost 6 months.  I am willing to send a short supply to a local pharmacy until she can be seen in office

## 2023-11-17 NOTE — TELEPHONE ENCOUNTER
Please verify script for Ativan there are two sets of directions patient is at the pharmacy and is not happy that it is not ready ...

## 2023-11-17 NOTE — TELEPHONE ENCOUNTER
Pt called back and scheduled appt.      Future Appointments   Date Time Provider [address]   11/20/2023 11:40 AM Archie Garcia APRN - CNP [facility] [place] [facility]   1/9/2024  1:40 PM Filippo Stout APRN - CNP [facility]     She uses [facility] [address]

## 2023-11-17 NOTE — TELEPHONE ENCOUNTER
I DO NOT SEE ANY RECENT DRUG TEST OR CONTROLLED SUBSTANCE MEDICATION AGREEMENT.     PATIENT REQUESTING 90 DAY SUPPLY TO MAIL ORDER.    6/7/2023 AWV      Future Appointments   Date Time Provider [address]   1/9/2024  1:40 PM Kun Stout, APRN - CNP [facility]

## 2023-11-20 ENCOUNTER — OFFICE VISIT (OUTPATIENT)
Dept: FAMILY MEDICINE CLINIC | Age: 78
End: 2023-11-20
Payer: MEDICARE

## 2023-11-20 VITALS
WEIGHT: 126 LBS | RESPIRATION RATE: 16 BRPM | OXYGEN SATURATION: 98 % | SYSTOLIC BLOOD PRESSURE: 124 MMHG | BODY MASS INDEX: 26.33 KG/M2 | TEMPERATURE: 97 F | HEART RATE: 94 BPM | DIASTOLIC BLOOD PRESSURE: 82 MMHG

## 2023-11-20 DIAGNOSIS — F41.9 ANXIETY: ICD-10-CM

## 2023-11-20 PROBLEM — G45.4 TRANSIENT GLOBAL AMNESIA: Status: ACTIVE | Noted: 2023-11-20

## 2023-11-20 PROBLEM — R74.8 ALKALINE PHOSPHATASE ELEVATION: Status: ACTIVE | Noted: 2018-06-22

## 2023-11-20 PROBLEM — G89.4 CHRONIC PAIN DISORDER: Status: ACTIVE | Noted: 2020-03-01

## 2023-11-20 PROBLEM — K85.90 ACUTE PANCREATITIS: Status: ACTIVE | Noted: 2020-10-15

## 2023-11-20 PROBLEM — R07.9 CHEST PAIN: Status: ACTIVE | Noted: 2020-10-15

## 2023-11-20 PROCEDURE — G8400 PT W/DXA NO RESULTS DOC: HCPCS | Performed by: NURSE PRACTITIONER

## 2023-11-20 PROCEDURE — 1090F PRES/ABSN URINE INCON ASSESS: CPT | Performed by: NURSE PRACTITIONER

## 2023-11-20 PROCEDURE — G8427 DOCREV CUR MEDS BY ELIG CLIN: HCPCS | Performed by: NURSE PRACTITIONER

## 2023-11-20 PROCEDURE — 99213 OFFICE O/P EST LOW 20 MIN: CPT | Performed by: NURSE PRACTITIONER

## 2023-11-20 PROCEDURE — 3079F DIAST BP 80-89 MM HG: CPT | Performed by: NURSE PRACTITIONER

## 2023-11-20 PROCEDURE — G8419 CALC BMI OUT NRM PARAM NOF/U: HCPCS | Performed by: NURSE PRACTITIONER

## 2023-11-20 PROCEDURE — G8484 FLU IMMUNIZE NO ADMIN: HCPCS | Performed by: NURSE PRACTITIONER

## 2023-11-20 PROCEDURE — 1123F ACP DISCUSS/DSCN MKR DOCD: CPT | Performed by: NURSE PRACTITIONER

## 2023-11-20 PROCEDURE — 1036F TOBACCO NON-USER: CPT | Performed by: NURSE PRACTITIONER

## 2023-11-20 PROCEDURE — 3074F SYST BP LT 130 MM HG: CPT | Performed by: NURSE PRACTITIONER

## 2023-11-20 RX ORDER — LORAZEPAM 1 MG/1
1.5 TABLET ORAL NIGHTLY
Qty: 135 TABLET | Refills: 0 | Status: SHIPPED | OUTPATIENT
Start: 2023-11-20 | End: 2024-02-18

## 2023-11-20 RX ORDER — VITAMIN B COMPLEX
1000 TABLET ORAL DAILY
COMMUNITY

## 2023-11-20 SDOH — ECONOMIC STABILITY: FOOD INSECURITY: WITHIN THE PAST 12 MONTHS, YOU WORRIED THAT YOUR FOOD WOULD RUN OUT BEFORE YOU GOT MONEY TO BUY MORE.: NEVER TRUE

## 2023-11-20 SDOH — ECONOMIC STABILITY: FOOD INSECURITY: WITHIN THE PAST 12 MONTHS, THE FOOD YOU BOUGHT JUST DIDN'T LAST AND YOU DIDN'T HAVE MONEY TO GET MORE.: NEVER TRUE

## 2023-11-20 SDOH — ECONOMIC STABILITY: INCOME INSECURITY: HOW HARD IS IT FOR YOU TO PAY FOR THE VERY BASICS LIKE FOOD, HOUSING, MEDICAL CARE, AND HEATING?: NOT HARD AT ALL

## 2023-11-20 ASSESSMENT — ENCOUNTER SYMPTOMS
BACK PAIN: 1
GASTROINTESTINAL NEGATIVE: 1
RESPIRATORY NEGATIVE: 1

## 2023-11-20 ASSESSMENT — ANXIETY QUESTIONNAIRES
GAD7 TOTAL SCORE: 0
4. TROUBLE RELAXING: 0
3. WORRYING TOO MUCH ABOUT DIFFERENT THINGS: 0
7. FEELING AFRAID AS IF SOMETHING AWFUL MIGHT HAPPEN: 0
IF YOU CHECKED OFF ANY PROBLEMS ON THIS QUESTIONNAIRE, HOW DIFFICULT HAVE THESE PROBLEMS MADE IT FOR YOU TO DO YOUR WORK, TAKE CARE OF THINGS AT HOME, OR GET ALONG WITH OTHER PEOPLE: NOT DIFFICULT AT ALL
6. BECOMING EASILY ANNOYED OR IRRITABLE: 0
5. BEING SO RESTLESS THAT IT IS HARD TO SIT STILL: 0
1. FEELING NERVOUS, ANXIOUS, OR ON EDGE: 0
2. NOT BEING ABLE TO STOP OR CONTROL WORRYING: 0

## 2023-11-20 ASSESSMENT — PATIENT HEALTH QUESTIONNAIRE - PHQ9
2. FEELING DOWN, DEPRESSED OR HOPELESS: 0
SUM OF ALL RESPONSES TO PHQ QUESTIONS 1-9: 0
SUM OF ALL RESPONSES TO PHQ9 QUESTIONS 1 & 2: 0
SUM OF ALL RESPONSES TO PHQ QUESTIONS 1-9: 0
1. LITTLE INTEREST OR PLEASURE IN DOING THINGS: 0

## 2023-11-20 NOTE — PROGRESS NOTES
11/20/2023    This is a 70 y.o. female   Chief Complaint   Patient presents with    Follow-up    Medication Check   . Juni Wray is seen today for follow up on anxiety and insomnia. She has been on ativan for  about 4 years. She takes 1.5 mg nightly for sleep. She states this is very helpful and she is able to sleep through the night. No reported daytime somnolence. She has not had any falls. She is now on daily narcotic therapy for her pain, but does not take the medications together. The pain is better controlled and this is also helping her sleep better. There have been nights that she has only take 1 mg of ativan. She does not have a recent urine drug screen nor a medication contract. 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 Max Daily Amount: 1.5 mg 30 tablet 0    oxyCODONE-acetaminophen (PERCOCET) 5-325 MG per tablet Take 1 tablet by mouth 2 times daily for 30 days. Max Daily Amount: 2 tablets 60 tablet 0    [START ON 12/8/2023] oxyCODONE-acetaminophen (PERCOCET) 5-325 MG per tablet Take 1 tablet by mouth 2 times daily for 30 days.  Max Daily Amount: 2 tablets 60 tablet 0    naloxone 4 MG/0.1ML LIQD nasal spray 1 spray by Nasal route as needed for Opioid Reversal 1 each 0    citalopram (CELEXA) 40 MG tablet TAKE 1 TABLET EVERY DAY 90 tablet 2    lisinopril (PRINIVIL;ZESTRIL) 20 MG tablet Take 1 tablet by mouth daily 90 tablet 2    denosumab (PROLIA) 60 MG/ML SOSY SC injection Inject 1 mL into the skin once for 1 dose 1 mL 0    diclofenac (VOLTAREN) 75 MG EC tablet TAKE ONE TABLET

## 2023-12-07 ENCOUNTER — TELEPHONE (OUTPATIENT)
Dept: FAMILY MEDICINE CLINIC | Age: 78
End: 2023-12-07

## 2023-12-07 DIAGNOSIS — F41.9 ANXIETY: ICD-10-CM

## 2023-12-07 NOTE — TELEPHONE ENCOUNTER
Patient called to request a refill of her lorazepam be sent to her local pharmacy because she will be out on Sunday. Reached out to her mail order pharmacy who stated that they shipped this RX that was sent to them on 11/20 out to the patient 12/05. He tracked the shipment which showed it delivered to her house as of 2:29 today (12/07). Called patient and left message that the RX was delivered today.

## 2023-12-07 NOTE — TELEPHONE ENCOUNTER
----- Message from Rafal Kym sent at 12/7/2023  3:25 PM EST -----  Subject: Refill Request    QUESTIONS  Name of Medication? LORazepam (ATIVAN) 1 MG tablet  Patient-reported dosage and instructions? taking 1.5 tablets every night  How many days do you have left? 2  Preferred Pharmacy? [address] [facility] [id number]  Pharmacy phone number (if available)? [phone]  Additional Information for Provider? pt. Kerry Patel expresses urgency  ---------------------------------------------------------------------------  --------------  Chantal GARCÍA  What is the best way for the office to contact you? OK to leave message on   voicemail  Preferred Call Back Phone Number? [phone]  ---------------------------------------------------------------------------  --------------  SCRIPT ANSWERS  Relationship to Patient?  Self

## 2023-12-08 ENCOUNTER — TELEPHONE (OUTPATIENT)
Dept: FAMILY MEDICINE CLINIC | Age: 78
End: 2023-12-08

## 2023-12-08 DIAGNOSIS — F41.9 ANXIETY: ICD-10-CM

## 2023-12-08 RX ORDER — LORAZEPAM 1 MG/1
1.5 TABLET ORAL NIGHTLY
Qty: 135 TABLET | Refills: 0 | OUTPATIENT
Start: 2023-12-08 | End: 2024-03-07

## 2023-12-08 NOTE — TELEPHONE ENCOUNTER
These are some side effects of the vaccine. If it persists past 24-48 hours then I would recommend testing for covid.

## 2023-12-08 NOTE — TELEPHONE ENCOUNTER
Dima Sullivan     [facility]    12/7/23  3:44 PM  Note      Patient called to request a refill of her lorazepam be sent to her local pharmacy because she will be out on Sunday. Reached out to her mail order pharmacy who stated that they shipped this RX that was sent to them on 11/20 out to the patient 12/05. He tracked the shipment which showed it delivered to her house as of 2:29 today (12/07). Called patient and left message that the RX was delivered today.

## 2023-12-08 NOTE — TELEPHONE ENCOUNTER
Pt went and had RSV shot yesterday. She woke up hot last night, headache, feels congested, worse when she lays down, gland on both sides of neck swollen. Are these side effects from the shot? Can covering provider address?

## 2023-12-11 RX ORDER — LORAZEPAM 1 MG/1
TABLET ORAL
Qty: 30 TABLET | Refills: 0 | Status: SHIPPED | OUTPATIENT
Start: 2023-12-11 | End: 2024-01-10

## 2023-12-11 NOTE — TELEPHONE ENCOUNTER
Future Appointments   Date Time Provider [facility]   1/3/2024  1:20 PM Fountain Kayser, APRN - CNP [facility] [place] [facility]   1/9/2024  1:40 PM XIMENA Youngblood CNP [facility]     LOV 11/20/2023

## 2024-01-03 ENCOUNTER — TELEPHONE (OUTPATIENT)
Dept: FAMILY MEDICINE CLINIC | Age: 79
End: 2024-01-03

## 2024-01-03 NOTE — TELEPHONE ENCOUNTER
Attempted to call patient regarding missed appointment with Bridgette huynh. LMTCB to reschedule. No show letter sent.

## 2024-01-22 RX ORDER — DENOSUMAB 60 MG/ML
INJECTION SUBCUTANEOUS
Qty: 1 ML | Refills: 0 | Status: SHIPPED | OUTPATIENT
Start: 2024-01-22

## 2024-01-22 NOTE — TELEPHONE ENCOUNTER
Future Appointments   Date Time Provider Department Center   2/6/2024  1:40 PM Srikanth Fregoso MD [facility]     LOV 6/7/2023

## 2024-01-29 RX ORDER — LISINOPRIL 20 MG/1
20 TABLET ORAL DAILY
Qty: 90 TABLET | Refills: 3 | Status: SHIPPED | OUTPATIENT
Start: 2024-01-29

## 2024-01-29 NOTE — TELEPHONE ENCOUNTER
LOV 11/20/2023    Future Appointments   Date Time Provider Department Center   2/6/2024  1:40 PM Srikanth Fregoso MD [facility]

## 2024-02-05 ENCOUNTER — OFFICE VISIT (OUTPATIENT)
Dept: FAMILY MEDICINE CLINIC | Age: 79
End: 2024-02-05
Payer: MEDICARE

## 2024-02-05 VITALS
SYSTOLIC BLOOD PRESSURE: 150 MMHG | OXYGEN SATURATION: 98 % | HEART RATE: 71 BPM | WEIGHT: 123.2 LBS | DIASTOLIC BLOOD PRESSURE: 64 MMHG | BODY MASS INDEX: 25.75 KG/M2

## 2024-02-05 DIAGNOSIS — H66.001 NON-RECURRENT ACUTE SUPPURATIVE OTITIS MEDIA OF RIGHT EAR WITHOUT SPONTANEOUS RUPTURE OF TYMPANIC MEMBRANE: Primary | ICD-10-CM

## 2024-02-05 PROCEDURE — G8400 PT W/DXA NO RESULTS DOC: HCPCS | Performed by: SURGERY

## 2024-02-05 PROCEDURE — 3078F DIAST BP <80 MM HG: CPT | Performed by: SURGERY

## 2024-02-05 PROCEDURE — 1036F TOBACCO NON-USER: CPT | Performed by: SURGERY

## 2024-02-05 PROCEDURE — 3077F SYST BP >= 140 MM HG: CPT | Performed by: SURGERY

## 2024-02-05 PROCEDURE — G8427 DOCREV CUR MEDS BY ELIG CLIN: HCPCS | Performed by: SURGERY

## 2024-02-05 PROCEDURE — G8484 FLU IMMUNIZE NO ADMIN: HCPCS | Performed by: SURGERY

## 2024-02-05 PROCEDURE — 1090F PRES/ABSN URINE INCON ASSESS: CPT | Performed by: SURGERY

## 2024-02-05 PROCEDURE — 99213 OFFICE O/P EST LOW 20 MIN: CPT | Performed by: SURGERY

## 2024-02-05 PROCEDURE — G8419 CALC BMI OUT NRM PARAM NOF/U: HCPCS | Performed by: SURGERY

## 2024-02-05 PROCEDURE — 1123F ACP DISCUSS/DSCN MKR DOCD: CPT | Performed by: SURGERY

## 2024-02-05 RX ORDER — AMOXICILLIN AND CLAVULANATE POTASSIUM 875; 125 MG/1; MG/1
1 TABLET, FILM COATED ORAL 2 TIMES DAILY
Qty: 14 TABLET | Refills: 0 | Status: SHIPPED | OUTPATIENT
Start: 2024-02-05 | End: 2024-02-12

## 2024-02-05 ASSESSMENT — PATIENT HEALTH QUESTIONNAIRE - PHQ9
SUM OF ALL RESPONSES TO PHQ QUESTIONS 1-9: 1
2. FEELING DOWN, DEPRESSED OR HOPELESS: 0
1. LITTLE INTEREST OR PLEASURE IN DOING THINGS: 1
SUM OF ALL RESPONSES TO PHQ9 QUESTIONS 1 & 2: 1
SUM OF ALL RESPONSES TO PHQ QUESTIONS 1-9: 1

## 2024-02-05 NOTE — PROGRESS NOTES
tablet by mouth daily      Vitamin D (CHOLECALCIFEROL) 25 MCG (1000 UT) TABS tablet Take 1 tablet by mouth daily      citalopram (CELEXA) 40 MG tablet TAKE 1 TABLET EVERY DAY 90 tablet 2    aspirin-acetaminophen-caffeine (EXCEDRIN MIGRAINE) 250-250-65 MG per tablet Take by mouth daily as needed      Misc. Devices (COMMODE BEDSIDE) MISC 1 Device by Does not apply route as needed (bedside commode as needed) 1 each 0    Ascorbic Acid (VITAMIN C) 500 MG CHEW Take 500 mg by mouth daily (Patient not taking: Reported on 2024)      naloxone 4 MG/0.1ML LIQD nasal spray 1 spray by Nasal route as needed for Opioid Reversal (Patient not taking: Reported on 2024) 1 each 0     No current facility-administered medications for this visit.       Allergies   Allergen Reactions    Sulfa Antibiotics Hives       Social History     Tobacco Use    Smoking status: Former     Current packs/day: 0.00     Average packs/day: 0.5 packs/day for 10.0 years (5.0 ttl pk-yrs)     Types: Cigarettes     Start date: 1967     Quit date: 1977     Years since quittin.6    Smokeless tobacco: Never   Substance Use Topics    Alcohol use: Not Currently       Review of Systems   Constitutional:  Negative for chills and fever.   HENT:  Positive for ear pain. Negative for congestion and sore throat.    Eyes:  Positive for discharge (clear, watery) and itching.   Respiratory:  Negative for cough and shortness of breath.    Cardiovascular:  Negative for chest pain, palpitations and leg swelling.   Gastrointestinal:  Negative for abdominal pain, constipation, diarrhea and nausea.   Genitourinary:  Negative for difficulty urinating and hematuria.   Neurological:  Negative for numbness and headaches.   Psychiatric/Behavioral:  Negative for sleep disturbance.        BP (!) 150/64 (Site: Left Upper Arm, Position: Sitting, Cuff Size: Medium Adult)   Pulse 71   Wt 55.9 kg (123 lb 3.2 oz)   SpO2 98%   BMI 25.75 kg/m²     Physical

## 2024-02-06 ENCOUNTER — TELEPHONE (OUTPATIENT)
Dept: FAMILY MEDICINE CLINIC | Age: 79
End: 2024-02-06

## 2024-02-06 DIAGNOSIS — F41.9 ANXIETY: ICD-10-CM

## 2024-02-06 RX ORDER — LORAZEPAM 1 MG/1
TABLET ORAL
Qty: 45 TABLET | Refills: 0 | Status: SHIPPED | OUTPATIENT
Start: 2024-02-06 | End: 2024-03-07

## 2024-02-06 ASSESSMENT — ENCOUNTER SYMPTOMS
EYE ITCHING: 1
CONSTIPATION: 0
SORE THROAT: 0
SHORTNESS OF BREATH: 0
ABDOMINAL PAIN: 0
DIARRHEA: 0
EYE DISCHARGE: 1
COUGH: 0
NAUSEA: 0

## 2024-02-06 NOTE — TELEPHONE ENCOUNTER
Had to cancel pt's appointment due to provider need. She was coming in for her medication f/u. She needs a refill on her lorazepam. Medication pended.

## 2024-02-08 ENCOUNTER — TELEPHONE (OUTPATIENT)
Dept: FAMILY MEDICINE CLINIC | Age: 79
End: 2024-02-08

## 2024-02-08 DIAGNOSIS — H66.001 NON-RECURRENT ACUTE SUPPURATIVE OTITIS MEDIA OF RIGHT EAR WITHOUT SPONTANEOUS RUPTURE OF TYMPANIC MEMBRANE: Primary | ICD-10-CM

## 2024-02-08 RX ORDER — AZITHROMYCIN 250 MG/1
250 TABLET, FILM COATED ORAL SEE ADMIN INSTRUCTIONS
Qty: 6 TABLET | Refills: 0 | Status: SHIPPED | OUTPATIENT
Start: 2024-02-08 | End: 2024-02-13

## 2024-02-08 NOTE — TELEPHONE ENCOUNTER
Patient called and states she was prescrived to Augmentin at her OV on 02/05/24, and it is disagreeing with her. She states she has had a severe headache, she can't sleep and she is worried about her blood pressures. She is requesting to be put on a different medication.     4:30 AM  158/98  10:00 /88    Please advise.

## 2024-02-09 ENCOUNTER — HOSPITAL ENCOUNTER (EMERGENCY)
Age: 79
Discharge: HOME | End: 2024-02-09
Attending: EMERGENCY MEDICINE
Payer: MEDICARE

## 2024-02-09 VITALS
TEMPERATURE: 98.3 F | DIASTOLIC BLOOD PRESSURE: 98 MMHG | SYSTOLIC BLOOD PRESSURE: 144 MMHG | RESPIRATION RATE: 18 BRPM | OXYGEN SATURATION: 99 % | HEART RATE: 94 BPM

## 2024-02-09 DIAGNOSIS — M43.6 TORTICOLLIS: Primary | ICD-10-CM

## 2024-02-09 LAB
ALBUMIN SERPL-MCNC: 4.2 G/DL (ref 3.4–5)
ALP SERPL-CCNC: 95 U/L (ref 40–129)
ALT SERPL-CCNC: 16 U/L (ref 10–40)
ANION GAP SERPL CALCULATED.3IONS-SCNC: 10 MMOL/L (ref 3–16)
AST SERPL-CCNC: 25 U/L (ref 15–37)
BASOPHILS # BLD: 0 K/UL (ref 0–0.2)
BASOPHILS NFR BLD: 0.5 %
BILIRUB DIRECT SERPL-MCNC: <0.2 MG/DL (ref 0–0.3)
BILIRUB INDIRECT SERPL-MCNC: NORMAL MG/DL (ref 0–1)
BILIRUB SERPL-MCNC: 0.3 MG/DL (ref 0–1)
BUN SERPL-MCNC: 17 MG/DL (ref 7–20)
CALCIUM SERPL-MCNC: 9.4 MG/DL (ref 8.3–10.6)
CHLORIDE SERPL-SCNC: 102 MMOL/L (ref 99–110)
CO2 SERPL-SCNC: 27 MMOL/L (ref 21–32)
CREAT SERPL-MCNC: 0.7 MG/DL (ref 0.6–1.2)
DEPRECATED RDW RBC AUTO: 14.2 % (ref 12.4–15.4)
EOSINOPHIL # BLD: 0.1 K/UL (ref 0–0.6)
EOSINOPHIL NFR BLD: 1.4 %
FLUAV RNA RESP QL NAA+PROBE: NOT DETECTED
FLUBV RNA RESP QL NAA+PROBE: NOT DETECTED
GFR SERPLBLD CREATININE-BSD FMLA CKD-EPI: >60 ML/MIN/{1.73_M2}
GLUCOSE SERPL-MCNC: 79 MG/DL (ref 70–99)
HCT VFR BLD AUTO: 37 % (ref 36–48)
HGB BLD-MCNC: 12.6 G/DL (ref 12–16)
LYMPHOCYTES # BLD: 1.7 K/UL (ref 1–5.1)
LYMPHOCYTES NFR BLD: 30.9 %
MCH RBC QN AUTO: 32.7 PG (ref 26–34)
MCHC RBC AUTO-ENTMCNC: 33.9 G/DL (ref 31–36)
MCV RBC AUTO: 96.5 FL (ref 80–100)
MONOCYTES # BLD: 0.6 K/UL (ref 0–1.3)
MONOCYTES NFR BLD: 11 %
NEUTROPHILS # BLD: 3 K/UL (ref 1.7–7.7)
NEUTROPHILS NFR BLD: 56.2 %
PLATELET # BLD AUTO: 188 K/UL (ref 135–450)
PMV BLD AUTO: 8.8 FL (ref 5–10.5)
POTASSIUM SERPL-SCNC: 4.1 MMOL/L (ref 3.5–5.1)
PROT SERPL-MCNC: 7.1 G/DL (ref 6.4–8.2)
RBC # BLD AUTO: 3.84 M/UL (ref 4–5.2)
SARS-COV-2 RNA RESP QL NAA+PROBE: NOT DETECTED
SODIUM SERPL-SCNC: 139 MMOL/L (ref 136–145)
WBC # BLD AUTO: 5.4 K/UL (ref 4–11)

## 2024-02-09 PROCEDURE — 6370000000 HC RX 637 (ALT 250 FOR IP): Performed by: EMERGENCY MEDICINE

## 2024-02-09 PROCEDURE — 36415 COLL VENOUS BLD VENIPUNCTURE: CPT

## 2024-02-09 PROCEDURE — 96375 TX/PRO/DX INJ NEW DRUG ADDON: CPT

## 2024-02-09 PROCEDURE — 80076 HEPATIC FUNCTION PANEL: CPT

## 2024-02-09 PROCEDURE — 96374 THER/PROPH/DIAG INJ IV PUSH: CPT

## 2024-02-09 PROCEDURE — 80048 BASIC METABOLIC PNL TOTAL CA: CPT

## 2024-02-09 PROCEDURE — 85025 COMPLETE CBC W/AUTO DIFF WBC: CPT

## 2024-02-09 PROCEDURE — 87636 SARSCOV2 & INF A&B AMP PRB: CPT

## 2024-02-09 PROCEDURE — 6360000002 HC RX W HCPCS: Performed by: EMERGENCY MEDICINE

## 2024-02-09 PROCEDURE — 99284 EMERGENCY DEPT VISIT MOD MDM: CPT

## 2024-02-09 RX ORDER — DIAZEPAM 5 MG/1
5 TABLET ORAL ONCE
Status: COMPLETED | OUTPATIENT
Start: 2024-02-09 | End: 2024-02-09

## 2024-02-09 RX ORDER — LIDOCAINE 50 MG/G
1 PATCH TOPICAL DAILY
Qty: 30 PATCH | Refills: 0 | Status: SHIPPED | OUTPATIENT
Start: 2024-02-09

## 2024-02-09 RX ORDER — MORPHINE SULFATE 4 MG/ML
4 INJECTION INTRAVENOUS
Status: COMPLETED | OUTPATIENT
Start: 2024-02-09 | End: 2024-02-09

## 2024-02-09 RX ORDER — KETOROLAC TROMETHAMINE 30 MG/ML
15 INJECTION, SOLUTION INTRAMUSCULAR; INTRAVENOUS ONCE
Status: COMPLETED | OUTPATIENT
Start: 2024-02-09 | End: 2024-02-09

## 2024-02-09 RX ORDER — LIDOCAINE 4 G/G
1 PATCH TOPICAL DAILY
Status: DISCONTINUED | OUTPATIENT
Start: 2024-02-09 | End: 2024-02-09 | Stop reason: HOSPADM

## 2024-02-09 RX ORDER — METHOCARBAMOL 750 MG/1
750 TABLET, FILM COATED ORAL 4 TIMES DAILY
Qty: 40 TABLET | Refills: 0 | Status: SHIPPED | OUTPATIENT
Start: 2024-02-09 | End: 2024-02-19

## 2024-02-09 RX ORDER — ACETAMINOPHEN 500 MG
1000 TABLET ORAL
Status: COMPLETED | OUTPATIENT
Start: 2024-02-09 | End: 2024-02-09

## 2024-02-09 RX ADMIN — MORPHINE SULFATE 4 MG: 4 INJECTION INTRAVENOUS at 21:15

## 2024-02-09 RX ADMIN — ACETAMINOPHEN 1000 MG: 500 TABLET ORAL at 20:10

## 2024-02-09 RX ADMIN — KETOROLAC TROMETHAMINE 15 MG: 30 INJECTION, SOLUTION INTRAMUSCULAR; INTRAVENOUS at 20:10

## 2024-02-09 RX ADMIN — DIAZEPAM 5 MG: 5 TABLET ORAL at 20:10

## 2024-02-09 ASSESSMENT — PAIN SCALES - GENERAL: PAINLEVEL_OUTOF10: 10

## 2024-02-09 ASSESSMENT — PAIN DESCRIPTION - DESCRIPTORS: DESCRIPTORS: SHARP

## 2024-02-09 ASSESSMENT — PAIN - FUNCTIONAL ASSESSMENT: PAIN_FUNCTIONAL_ASSESSMENT: 0-10

## 2024-02-09 ASSESSMENT — LIFESTYLE VARIABLES: HOW OFTEN DO YOU HAVE A DRINK CONTAINING ALCOHOL: NEVER

## 2024-02-09 ASSESSMENT — PAIN DESCRIPTION - ORIENTATION: ORIENTATION: LEFT

## 2024-02-09 ASSESSMENT — PAIN DESCRIPTION - LOCATION: LOCATION: BACK;NECK

## 2024-02-09 NOTE — ED TRIAGE NOTES
[facility] Emergency Department  MEDICAL SCREENING EXAM    Date of Service: 2/9/2024    Reason for Visit: Neck Pain (Pt presents for neck pain that started last week. States she was seen for an eye infection and feels that infectiomn has moved into her neck. States pain in her neck started Tuesday and radiates down her left arm and shoulder.)    Patient History, Brief Exam, and Initial Assessment     Abbreviated HPI: Nguyen Morales is a 78 y.o. female presenting with viral infection.  Had viral infection in eyes and ears last week.  Was getting headaches last week, PCP changed her from Augmentin Azithromycin with concerns for reaction to the medication.  BP has been 'high' for several days.  Now endorses a 'stiff neck', going down R arm.  Headache has been constant, started over the weekend, hit her 'all of the sudden'.  Neck pain / stiffness started yesterday.  Couldn't stand the pain, called PCP and pain doctor who told her to come to the ER.  Also has had some nausea and vomiting with the headaches.     Able to ambulate independently without difficulty.  Clear speech.  Basic cranial nerves intact.     INITIAL VITALS: BP: (!) 146/104, Temp: 98.3 °F (36.8 °C), Pulse: 96, Respirations: 16, SpO2: 100 %    Plan     Patient was evaluated in the REU for a medical screening exam.  To further evaluate the presenting complaints, the following orders have been placed:  Orders Placed This Encounter   Procedures    COVID-19 & Influenza Combo    CBC with Auto Differential    BMP w/ Reflex to MG    Hepatic Function Panel        See primary provider's note for full details and final disposition.     Current Facility-Administered Medications:   No orders of the defined types were placed in this encounter.    REU Dispo     Stable for lobby while awaiting ED bed.    Relevant Medical History     Past Medical History:   Diagnosis Date    Anesthesia     disoriented post op    Arthritis     Cerebral artery occlusion with

## 2024-02-10 NOTE — ED PROVIDER NOTES
THE [facility]  EMERGENCY DEPARTMENT ENCOUNTER          ATTENDING PHYSICIAN NOTE       Date of evaluation: 2/9/2024    Chief Complaint     Neck Pain (Pt presents for neck pain that started last week. States she was seen for an eye infection and feels that infectiomn has moved into her neck. States pain in her neck started Tuesday and radiates down her left arm and shoulder.)      History of Present Illness     Nguyen Morales is a 78 y.o. female who presents to the emergency department with neck pain.  Patient states that she has been having left-sided neck pain since last Tuesday.  She states that radiates down her left side into her shoulder and forearm.  She denies any falls or trauma.  Denies any chest pain, shortness of breath, nausea or vomiting.  She denies any recent fevers or coughs.    Of note last week she was diagnosed with a viral URI with watery eyes and ear fullness and was treated with Augmentin and azithromycin by her primary care.    ASSESSMENT / PLAN  (MEDICAL DECISION MAKING)     INITIAL VITALS: BP: (!) 146/104, Temp: 98.3 °F (36.8 °C), Pulse: 96, Respirations: 16, SpO2: 100 %      Nguyen Morales is a 78 y.o. female who presents to the emergency department with complaint of neck pain.  On examination the patient has findings consistent with muscular strain.  She has tenderness in her paravertebral musculature as well as her trapezius.  She has no midline tenderness.  I have low concern for ACS based on her symptom description as well as her physical exam.  Screening laboratory workup was reviewed which were ordered in the MSE process that showed no acute concerns.  No electrolyte derangement specifically.    She is treated symptomatically with antispasmodics and anti-inflammatories with improvement.  I discussed symptomatic care at home as well as stretching exercises.  She is discharged with return precautions..      Medical Decision Making  Risk  OTC drugs.  Prescription drug  Arthritis, Cerebral artery occlusion with cerebral infarction (HCC), Gastric ulcer, Gastroesophageal reflux disease without esophagitis, History of esophagogastroduodenoscopy, Hyperlipidemia, Hypertension, Kidney stone, and Osteoporosis.  She has a past surgical history that includes Hysterectomy; joint replacement (Bilateral); Cholecystectomy; Lithotripsy; Dilation and curettage of uterus; Gastric bypass surgery; Foot surgery; other surgical history (Left, 07/11/2017); Nose surgery; other surgical history (Right, 10/24/2017); Breast surgery; and Revision total knee arthroplasty (Right, 9/29/2020).  Her family history is not on file.  She reports that she quit smoking about 46 years ago. Her smoking use included cigarettes. She started smoking about 56 years ago. She has a 5.0 pack-year smoking history. She has never used smokeless tobacco. She reports that she does not currently use alcohol. She reports that she does not use drugs.    Medications     Previous Medications    AMOXICILLIN-CLAVULANATE (AUGMENTIN) 875-125 MG PER TABLET    Take 1 tablet by mouth 2 times daily for 7 days    ASCORBIC ACID (VITAMIN C) 500 MG CHEW    Take 500 mg by mouth daily    ASPIRIN-ACETAMINOPHEN-CAFFEINE (EXCEDRIN MIGRAINE) 250-250-65 MG PER TABLET    Take by mouth daily as needed    AZITHROMYCIN (ZITHROMAX) 250 MG TABLET    Take 1 tablet by mouth See Admin Instructions for 5 days 500mg on day 1 followed by 250mg on days 2 - 5    CITALOPRAM (CELEXA) 40 MG TABLET    TAKE 1 TABLET EVERY DAY    LISINOPRIL (PRINIVIL;ZESTRIL) 20 MG TABLET    TAKE 1 TABLET EVERY DAY    LORAZEPAM (ATIVAN) 1 MG TABLET    TAKE 1 AND 1/2 TABLET BY MOUTH ONCE NIGHTLY AS NEEDED FOR SLEEP    MISC. DEVICES (COMMODE BEDSIDE) MISC    1 Device by Does not apply route as needed (bedside commode as needed)    MULTIPLE VITAMINS-MINERALS ER PO    Take 1 tablet by mouth daily    NALOXONE 4 MG/0.1ML LIQD NASAL SPRAY    1 spray by Nasal route as needed for Opioid Reversal     spray 1 spray by Nasal route as needed for Opioid Reversal, Disp-1 each, R-0Normal      citalopram (CELEXA) 40 MG tablet TAKE 1 TABLET EVERY DAY, Disp-90 tablet, R-2Normal      aspirin-acetaminophen-caffeine (EXCEDRIN MIGRAINE) 250-250-65 MG per tablet Take by mouth daily as neededHistorical Med      Misc. Devices (COMMODE BEDSIDE) MISC PRN Starting Sat 10/17/2020, Disp-1 each,R-0, Print             Allergies     She is allergic to sulfa antibiotics and augmentin [amoxicillin-pot clavulanate].    Physical Exam     INITIAL VITALS: BP: (!) 146/104, Temp: 98.3 °F (36.8 °C), Pulse: 96, Respirations: 16, SpO2: 100 %   Physical Exam    General:  Well developed adult female in no acute distress, able toconverse in full sentences  HEENT:  Normocephalic, atraumatic, PERRL, extra-ocular movements intact, oropharynx benign  Neck: Tenderness to palpation over paravertebral cervical musculature on the left side including her trapezius.  No midline tenderness to palpation.  Pulmonary:   Clear to auscultation bilaterally, good air movement, no wheezes  Cardiac:  Regular rate and rhythm, no M/R/G  Abdomen:  Soft, non-tender, non-distended, no rebounding or guarding  Musculoskeletal:  2+ pulses, no edema or clubbing  Skin:  No concerning rashes or lesions, no cyanosis  Neuro: Alert and oriented X 4,able to move all four extremities with equal strength bilaterally, sensory exam grossly intact, cranial nerves II-XII intact  Psych:  Appropriate mood and affect                 Santiago Cano MD  02/17/24 0020

## 2024-03-01 ENCOUNTER — OFFICE VISIT (OUTPATIENT)
Dept: FAMILY MEDICINE CLINIC | Age: 79
End: 2024-03-01
Payer: MEDICARE

## 2024-03-01 VITALS
SYSTOLIC BLOOD PRESSURE: 132 MMHG | WEIGHT: 116 LBS | DIASTOLIC BLOOD PRESSURE: 84 MMHG | RESPIRATION RATE: 17 BRPM | BODY MASS INDEX: 24.24 KG/M2 | OXYGEN SATURATION: 97 % | TEMPERATURE: 97 F | HEART RATE: 78 BPM

## 2024-03-01 DIAGNOSIS — J06.9 VIRAL URI: Primary | ICD-10-CM

## 2024-03-01 LAB
Lab: 0
PERFORMING INSTRUMENT: NORMAL
QC PASS/FAIL: NORMAL
SARS-COV-2, POC: NORMAL

## 2024-03-01 PROCEDURE — 1036F TOBACCO NON-USER: CPT | Performed by: NURSE PRACTITIONER

## 2024-03-01 PROCEDURE — 87426 SARSCOV CORONAVIRUS AG IA: CPT | Performed by: NURSE PRACTITIONER

## 2024-03-01 PROCEDURE — 3079F DIAST BP 80-89 MM HG: CPT | Performed by: NURSE PRACTITIONER

## 2024-03-01 PROCEDURE — G8420 CALC BMI NORM PARAMETERS: HCPCS | Performed by: NURSE PRACTITIONER

## 2024-03-01 PROCEDURE — 99213 OFFICE O/P EST LOW 20 MIN: CPT | Performed by: NURSE PRACTITIONER

## 2024-03-01 PROCEDURE — 1090F PRES/ABSN URINE INCON ASSESS: CPT | Performed by: NURSE PRACTITIONER

## 2024-03-01 PROCEDURE — G8484 FLU IMMUNIZE NO ADMIN: HCPCS | Performed by: NURSE PRACTITIONER

## 2024-03-01 PROCEDURE — G8427 DOCREV CUR MEDS BY ELIG CLIN: HCPCS | Performed by: NURSE PRACTITIONER

## 2024-03-01 PROCEDURE — G8400 PT W/DXA NO RESULTS DOC: HCPCS | Performed by: NURSE PRACTITIONER

## 2024-03-01 PROCEDURE — 3075F SYST BP GE 130 - 139MM HG: CPT | Performed by: NURSE PRACTITIONER

## 2024-03-01 PROCEDURE — 1123F ACP DISCUSS/DSCN MKR DOCD: CPT | Performed by: NURSE PRACTITIONER

## 2024-03-01 RX ORDER — CODEINE PHOSPHATE/GUAIFENESIN 10-100MG/5
5 LIQUID (ML) ORAL 2 TIMES DAILY PRN
Qty: 50 ML | Refills: 0 | Status: SHIPPED | OUTPATIENT
Start: 2024-03-01 | End: 2024-03-06

## 2024-03-01 RX ORDER — MULTIVIT-MIN/IRON/FOLIC ACID/K 18-600-40
CAPSULE ORAL
COMMUNITY
End: 2024-03-01

## 2024-03-01 RX ORDER — LIDOCAINE 4 G/G
PATCH TOPICAL
COMMUNITY
End: 2024-03-01

## 2024-03-01 RX ORDER — METHOCARBAMOL 750 MG/1
TABLET, FILM COATED ORAL EVERY 8 HOURS
COMMUNITY

## 2024-03-01 ASSESSMENT — PATIENT HEALTH QUESTIONNAIRE - PHQ9
SUM OF ALL RESPONSES TO PHQ QUESTIONS 1-9: 0
1. LITTLE INTEREST OR PLEASURE IN DOING THINGS: 0
SUM OF ALL RESPONSES TO PHQ QUESTIONS 1-9: 0
SUM OF ALL RESPONSES TO PHQ9 QUESTIONS 1 & 2: 0
2. FEELING DOWN, DEPRESSED OR HOPELESS: 0
SUM OF ALL RESPONSES TO PHQ QUESTIONS 1-9: 0

## 2024-03-01 ASSESSMENT — ENCOUNTER SYMPTOMS
SHORTNESS OF BREATH: 1
FACIAL SWELLING: 0
SINUS PRESSURE: 0
SINUS PAIN: 0
SORE THROAT: 1
WHEEZING: 0
GASTROINTESTINAL NEGATIVE: 1
COUGH: 1
DIARRHEA: 0
VOMITING: 0
EYES NEGATIVE: 1
NAUSEA: 0
RHINORRHEA: 1

## 2024-03-01 ASSESSMENT — ANXIETY QUESTIONNAIRES
IF YOU CHECKED OFF ANY PROBLEMS ON THIS QUESTIONNAIRE, HOW DIFFICULT HAVE THESE PROBLEMS MADE IT FOR YOU TO DO YOUR WORK, TAKE CARE OF THINGS AT HOME, OR GET ALONG WITH OTHER PEOPLE: NOT DIFFICULT AT ALL
6. BECOMING EASILY ANNOYED OR IRRITABLE: 0
1. FEELING NERVOUS, ANXIOUS, OR ON EDGE: 0
2. NOT BEING ABLE TO STOP OR CONTROL WORRYING: 0
4. TROUBLE RELAXING: 0
3. WORRYING TOO MUCH ABOUT DIFFERENT THINGS: 0
5. BEING SO RESTLESS THAT IT IS HARD TO SIT STILL: 0

## 2024-03-01 NOTE — PATIENT INSTRUCTIONS
Start mucinex   Push fluids  Hold pain medication while taking codeine cough syrup  Add flonase nasal spray  Cough syrup might make you tired.

## 2024-03-01 NOTE — PROGRESS NOTES
Medications   Medication Sig Dispense Refill    LORazepam (ATIVAN) 1 MG tablet TAKE 1 AND 1/2 TABLET BY MOUTH ONCE NIGHTLY AS NEEDED FOR SLEEP 45 tablet 0    methocarbamol (ROBAXIN) 750 MG tablet Take by mouth every 8 (eight) hours      oxyCODONE-acetaminophen (PERCOCET) 5-325 MG per tablet Take 1 tablet by mouth 2 times daily for 30 days. Max Daily Amount: 2 tablets 60 tablet 0    lidocaine (LIDODERM) 5 % Place 1 patch onto the skin daily 12 hours on, 12 hours off. 30 patch 0    lisinopril (PRINIVIL;ZESTRIL) 20 MG tablet TAKE 1 TABLET EVERY DAY 90 tablet 3    PROLIA 60 MG/ML SOSY SC injection INJECT 1 MILLILITER UNDER THE SKIN ONCE FOR 1 DOSE EVERY 6 MONTHS 1 mL 0    MULTIPLE VITAMINS-MINERALS ER PO Take 1 tablet by mouth daily      Vitamin D (CHOLECALCIFEROL) 25 MCG (1000 UT) TABS tablet Take 1 tablet by mouth daily      naloxone 4 MG/0.1ML LIQD nasal spray 1 spray by Nasal route as needed for Opioid Reversal (Patient not taking: Reported on 2024) 1 each 0    citalopram (CELEXA) 40 MG tablet TAKE 1 TABLET EVERY DAY 90 tablet 2    aspirin-acetaminophen-caffeine (EXCEDRIN MIGRAINE) 250-250-65 MG per tablet Take by mouth daily as needed      Misc. Devices (COMMODE BEDSIDE) MISC 1 Device by Does not apply route as needed (bedside commode as needed) 1 each 0     No current facility-administered medications for this visit.       Allergies   Allergen Reactions    Sulfa Antibiotics Hives    Augmentin [Amoxicillin-Pot Clavulanate] Nausea And Vomiting       There were no vitals taken for this visit.    Social History     Tobacco Use    Smoking status: Former     Current packs/day: 0.00     Average packs/day: 0.5 packs/day for 10.0 years (5.0 ttl pk-yrs)     Types: Cigarettes     Start date: 1967     Quit date: 1977     Years since quittin.7    Smokeless tobacco: Never   Substance Use Topics    Alcohol use: Not Currently       Review of Systems   Constitutional:  Positive for fatigue and fever (101 at home

## 2024-03-11 ENCOUNTER — TELEPHONE (OUTPATIENT)
Dept: FAMILY MEDICINE CLINIC | Age: 79
End: 2024-03-11

## 2024-03-11 RX ORDER — AZITHROMYCIN 250 MG/1
TABLET, FILM COATED ORAL
Qty: 6 TABLET | Refills: 0 | Status: SHIPPED | OUTPATIENT
Start: 2024-03-11 | End: 2024-03-21

## 2024-03-11 NOTE — TELEPHONE ENCOUNTER
Patient is calling in due still being sick her last visit was 3/1/24 She's still having yellow mucus and coughing. She's asking if she can have a Z pack

## 2024-03-11 NOTE — TELEPHONE ENCOUNTER
Patient last saw Tiarra on 03/01/24 for a viral URI. She continues to cough and his producing yellow mucus. She is requesting a zpak. Would Tiarra be willing to send this in?

## 2024-03-22 ENCOUNTER — TELEPHONE (OUTPATIENT)
Dept: FAMILY MEDICINE CLINIC | Age: 79
End: 2024-03-22

## 2024-03-22 NOTE — TELEPHONE ENCOUNTER
Patient needs a refill on Ativan. They need a 30 day supply.     Mail order or local pharmacy: local    Pharmacy: [facility]    Last OV: 3/1/24 w/[facility]    Future Appointments   Date Time Provider Department Center   4/11/2024  3:00 PM Tiarra Garcia APRN - CNP [facility] [place] - [facility]   5/7/2024  2:10 PM Lani Stout APRN - CNP [facility]

## 2024-03-25 DIAGNOSIS — F41.9 ANXIETY: ICD-10-CM

## 2024-03-25 RX ORDER — LORAZEPAM 1 MG/1
TABLET ORAL
Qty: 30 TABLET | Refills: 0 | Status: SHIPPED | OUTPATIENT
Start: 2024-03-25 | End: 2024-04-24

## 2024-04-11 ENCOUNTER — OFFICE VISIT (OUTPATIENT)
Dept: FAMILY MEDICINE CLINIC | Age: 79
End: 2024-04-11
Payer: MEDICARE

## 2024-04-11 VITALS
DIASTOLIC BLOOD PRESSURE: 76 MMHG | OXYGEN SATURATION: 97 % | TEMPERATURE: 97.9 F | WEIGHT: 118 LBS | HEART RATE: 97 BPM | RESPIRATION RATE: 16 BRPM | SYSTOLIC BLOOD PRESSURE: 126 MMHG | BODY MASS INDEX: 24.66 KG/M2

## 2024-04-11 DIAGNOSIS — F41.9 ANXIETY: Primary | ICD-10-CM

## 2024-04-11 DIAGNOSIS — E78.2 MIXED HYPERLIPIDEMIA: ICD-10-CM

## 2024-04-11 DIAGNOSIS — M19.91 PRIMARY OSTEOARTHRITIS, UNSPECIFIED SITE: ICD-10-CM

## 2024-04-11 DIAGNOSIS — M51.36 LUMBAR DEGENERATIVE DISC DISEASE: ICD-10-CM

## 2024-04-11 PROBLEM — G45.4 TRANSIENT GLOBAL AMNESIA: Status: RESOLVED | Noted: 2023-11-20 | Resolved: 2024-04-11

## 2024-04-11 PROBLEM — M19.019 OSTEOARTHRITIS OF GLENOHUMERAL JOINT: Status: ACTIVE | Noted: 2024-04-11

## 2024-04-11 PROBLEM — R07.9 CHEST PAIN: Status: RESOLVED | Noted: 2020-10-15 | Resolved: 2024-04-11

## 2024-04-11 PROBLEM — K85.90 ACUTE PANCREATITIS: Status: RESOLVED | Noted: 2020-10-15 | Resolved: 2024-04-11

## 2024-04-11 PROBLEM — R74.8 ALKALINE PHOSPHATASE ELEVATION: Status: RESOLVED | Noted: 2018-06-22 | Resolved: 2024-04-11

## 2024-04-11 PROBLEM — M75.40 SUBACROMIAL IMPINGEMENT: Status: ACTIVE | Noted: 2024-04-11

## 2024-04-11 PROBLEM — I63.9 STROKE DETERMINED BY CLINICAL ASSESSMENT (HCC): Status: RESOLVED | Noted: 2018-06-12 | Resolved: 2024-04-11

## 2024-04-11 PROCEDURE — 1036F TOBACCO NON-USER: CPT | Performed by: NURSE PRACTITIONER

## 2024-04-11 PROCEDURE — 3078F DIAST BP <80 MM HG: CPT | Performed by: NURSE PRACTITIONER

## 2024-04-11 PROCEDURE — 1090F PRES/ABSN URINE INCON ASSESS: CPT | Performed by: NURSE PRACTITIONER

## 2024-04-11 PROCEDURE — 99214 OFFICE O/P EST MOD 30 MIN: CPT | Performed by: NURSE PRACTITIONER

## 2024-04-11 PROCEDURE — 3074F SYST BP LT 130 MM HG: CPT | Performed by: NURSE PRACTITIONER

## 2024-04-11 PROCEDURE — G8420 CALC BMI NORM PARAMETERS: HCPCS | Performed by: NURSE PRACTITIONER

## 2024-04-11 PROCEDURE — 1123F ACP DISCUSS/DSCN MKR DOCD: CPT | Performed by: NURSE PRACTITIONER

## 2024-04-11 PROCEDURE — G8400 PT W/DXA NO RESULTS DOC: HCPCS | Performed by: NURSE PRACTITIONER

## 2024-04-11 PROCEDURE — G8427 DOCREV CUR MEDS BY ELIG CLIN: HCPCS | Performed by: NURSE PRACTITIONER

## 2024-04-11 RX ORDER — LORAZEPAM 1 MG/1
TABLET ORAL
Qty: 45 TABLET | Refills: 0 | Status: SHIPPED | OUTPATIENT
Start: 2024-04-11 | End: 2024-05-11

## 2024-04-11 RX ORDER — NALOXONE HYDROCHLORIDE 2 MG/.4ML
2 INJECTION, SOLUTION INTRAMUSCULAR; SUBCUTANEOUS
Qty: 0.4 ML | Refills: 0 | Status: SHIPPED | OUTPATIENT
Start: 2024-04-11 | End: 2024-04-11

## 2024-04-11 ASSESSMENT — PATIENT HEALTH QUESTIONNAIRE - PHQ9
SUM OF ALL RESPONSES TO PHQ QUESTIONS 1-9: 0
SUM OF ALL RESPONSES TO PHQ QUESTIONS 1-9: 0
1. LITTLE INTEREST OR PLEASURE IN DOING THINGS: NOT AT ALL
SUM OF ALL RESPONSES TO PHQ QUESTIONS 1-9: 0
SUM OF ALL RESPONSES TO PHQ9 QUESTIONS 1 & 2: 0
2. FEELING DOWN, DEPRESSED OR HOPELESS: NOT AT ALL
SUM OF ALL RESPONSES TO PHQ QUESTIONS 1-9: 0

## 2024-04-11 ASSESSMENT — ANXIETY QUESTIONNAIRES
1. FEELING NERVOUS, ANXIOUS, OR ON EDGE: NOT AT ALL
5. BEING SO RESTLESS THAT IT IS HARD TO SIT STILL: NOT AT ALL
3. WORRYING TOO MUCH ABOUT DIFFERENT THINGS: NOT AT ALL
6. BECOMING EASILY ANNOYED OR IRRITABLE: NOT AT ALL
2. NOT BEING ABLE TO STOP OR CONTROL WORRYING: NOT AT ALL
GAD7 TOTAL SCORE: 0
7. FEELING AFRAID AS IF SOMETHING AWFUL MIGHT HAPPEN: NOT AT ALL
IF YOU CHECKED OFF ANY PROBLEMS ON THIS QUESTIONNAIRE, HOW DIFFICULT HAVE THESE PROBLEMS MADE IT FOR YOU TO DO YOUR WORK, TAKE CARE OF THINGS AT HOME, OR GET ALONG WITH OTHER PEOPLE: NOT DIFFICULT AT ALL
4. TROUBLE RELAXING: NOT AT ALL

## 2024-04-11 ASSESSMENT — ENCOUNTER SYMPTOMS
RESPIRATORY NEGATIVE: 1
EYES NEGATIVE: 1
ALLERGIC/IMMUNOLOGIC NEGATIVE: 1
SHORTNESS OF BREATH: 0
GASTROINTESTINAL NEGATIVE: 1
BACK PAIN: 1

## 2024-04-11 NOTE — PROGRESS NOTES
4/11/2024    This is a 79 y.o. female   Chief Complaint   Patient presents with    Established New Doctor    Anxiety   .  Nguyen is here today to establish care with new PCP. She is overall doing well. She does have concerns regarding her ativan prescription. She states she takes it to help her sleep. She take 1.5mg nightly but occasionally will have to take an additional 0.5mg to get to sleep. She denies anxiety as an issue. She has tried OTC medications for sleep but they did not help. She has not tried any other prescription sleep aids and is worried about stopping ativan. 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     Depression: she has been taking Celexa for >9 years, since her  passed away. She denies feelings of depression but states when she has tried to stop the medication she will start crying uncontrollably after a week.    Hypertension: she is checking her blood pressure occasionally at home when she has a headache(only once every 1-2 months). She states it is slightly elevated at these times but was unable to provide readings. She denies any chest pains, palpitations, peripheral edema.    Osteoarthritis: She is followed by pain management for osteoarthritis. She states she is taking Percocet 2x daily. She still has pain but this is helping to make it manageable.          Past Medical History:   Diagnosis Date    Anesthesia     disoriented post op    Arthritis     Cerebral artery occlusion with cerebral infarction (HCC)     TIA    Gastric ulcer     Gastroesophageal reflux disease without esophagitis 7/15/2016    History of esophagogastroduodenoscopy     Hyperlipidemia     Hypertension     Kidney stone     Osteoporosis        Past Surgical History:   Procedure Laterality Date    BREAST SURGERY      tumor removed    CHOLECYSTECTOMY      DILATION AND CURETTAGE OF UTERUS      FOOT SURGERY      GASTRIC BYPASS SURGERY

## 2024-04-12 LAB — ANNOTATION COMMENT IMP: NORMAL

## 2024-04-15 ENCOUNTER — TELEPHONE (OUTPATIENT)
Dept: FAMILY MEDICINE CLINIC | Age: 79
End: 2024-04-15

## 2024-04-15 LAB
6MAM UR QL: NOT DETECTED
7AMINOCLONAZEPAM UR QL: NOT DETECTED
A-OH ALPRAZ UR QL: NOT DETECTED
ALPHA-OH-MIDAZOLAM, URINE: NOT DETECTED
ALPRAZ UR QL: NOT DETECTED
AMPHET UR QL SCN: NOT DETECTED
ANNOTATION COMMENT IMP: NORMAL
ANNOTATION COMMENT IMP: NORMAL
BARBITURATES UR QL: NEGATIVE
BUPRENORPHINE UR QL: NOT DETECTED
BZE UR QL: NEGATIVE
CARBOXYTHC UR QL: NEGATIVE
CARISOPRODOL UR QL: NEGATIVE
CLONAZEPAM UR QL: NOT DETECTED
CODEINE UR QL: NOT DETECTED
CREAT UR-MCNC: 51.6 MG/DL (ref 20–400)
DIAZEPAM UR QL: NOT DETECTED
ETHYL GLUCURONIDE UR QL: NEGATIVE
FENTANYL UR QL: NOT DETECTED
GABAPENTIN: NOT DETECTED
HYDROCODONE UR QL: NOT DETECTED
HYDROMORPHONE UR QL: NOT DETECTED
LORAZEPAM UR QL: PRESENT
MDA UR QL: NOT DETECTED
MDEA UR QL: NOT DETECTED
MDMA UR QL: NOT DETECTED
MEPERIDINE UR QL: NOT DETECTED
METHADONE UR QL: NEGATIVE
METHAMPHET UR QL: NOT DETECTED
MIDAZOLAM UR QL SCN: NOT DETECTED
MORPHINE UR QL: NOT DETECTED
NALOXONE: NOT DETECTED
NORBUPRENORPHINE UR QL CFM: NOT DETECTED
NORDIAZEPAM UR QL: NOT DETECTED
NORFENTANYL UR QL: NOT DETECTED
NORHYDROCODONE UR QL CFM: NOT DETECTED
NOROXYCODONE UR QL CFM: PRESENT
NOROXYMORPHONE, URINE: PRESENT
OXAZEPAM UR QL: NOT DETECTED
OXYCODONE UR QL: PRESENT
OXYMORPHONE UR QL: PRESENT
PATHOLOGY STUDY: NORMAL
PCP UR QL: NEGATIVE
PHENTERMINE UR QL: NOT DETECTED
PPAA UR QL: NOT DETECTED
PREGABALIN: NOT DETECTED
SERVICE CMNT-IMP: NORMAL
TAPENTADOL UR QL SCN: NOT DETECTED
TAPENTADOL-O-SULFATE, URINE: NOT DETECTED
TEMAZEPAM UR QL: NOT DETECTED
TRAMADOL UR QL: NEGATIVE
ZOLPIDEM UR QL: NOT DETECTED

## 2024-04-15 NOTE — TELEPHONE ENCOUNTER
Martha called. Disp Refills Start End    Naloxone HCl 2 MG/0.4ML SOAJ        Is on back order  can it be changed to the nasal spray?

## 2024-05-13 ENCOUNTER — TELEPHONE (OUTPATIENT)
Dept: FAMILY MEDICINE CLINIC | Age: 79
End: 2024-05-13

## 2024-05-13 DIAGNOSIS — F41.9 ANXIETY: ICD-10-CM

## 2024-05-13 RX ORDER — LORAZEPAM 1 MG/1
TABLET ORAL
Qty: 45 TABLET | Refills: 0 | Status: SHIPPED | OUTPATIENT
Start: 2024-05-13 | End: 2024-06-12

## 2024-05-13 NOTE — TELEPHONE ENCOUNTER
Future Appointments   Date Time Provider Department Center   6/12/2024  3:00 PM Tiarra Garcia APRN - CNP [facility] [place] - [facility]   7/10/2024 12:30 PM Lani Stout APRN - CNP [facility]     LOV 4/11/2024    Contract and UDS up to date. Please send in providers absence thank you

## 2024-05-13 NOTE — TELEPHONE ENCOUNTER
Patient needs a refill on her med, she is out and she is requesting another provider fill it today      LORazepam (ATIVAN) 1 MG tablet      30 day  [facility]

## 2024-06-12 ENCOUNTER — OFFICE VISIT (OUTPATIENT)
Dept: FAMILY MEDICINE CLINIC | Age: 79
End: 2024-06-12
Payer: MEDICARE

## 2024-06-12 VITALS
TEMPERATURE: 97.6 F | OXYGEN SATURATION: 98 % | HEART RATE: 89 BPM | RESPIRATION RATE: 16 BRPM | WEIGHT: 112 LBS | BODY MASS INDEX: 22.58 KG/M2 | DIASTOLIC BLOOD PRESSURE: 80 MMHG | SYSTOLIC BLOOD PRESSURE: 102 MMHG | HEIGHT: 59 IN

## 2024-06-12 DIAGNOSIS — I10 ESSENTIAL HYPERTENSION: Chronic | ICD-10-CM

## 2024-06-12 DIAGNOSIS — E78.2 MIXED HYPERLIPIDEMIA: ICD-10-CM

## 2024-06-12 DIAGNOSIS — Z00.00 MEDICARE ANNUAL WELLNESS VISIT, SUBSEQUENT: Primary | ICD-10-CM

## 2024-06-12 DIAGNOSIS — M81.0 AGE-RELATED OSTEOPOROSIS WITHOUT CURRENT PATHOLOGICAL FRACTURE: ICD-10-CM

## 2024-06-12 DIAGNOSIS — F41.9 ANXIETY: ICD-10-CM

## 2024-06-12 PROCEDURE — 3079F DIAST BP 80-89 MM HG: CPT | Performed by: NURSE PRACTITIONER

## 2024-06-12 PROCEDURE — 1123F ACP DISCUSS/DSCN MKR DOCD: CPT | Performed by: NURSE PRACTITIONER

## 2024-06-12 PROCEDURE — 3074F SYST BP LT 130 MM HG: CPT | Performed by: NURSE PRACTITIONER

## 2024-06-12 PROCEDURE — G0439 PPPS, SUBSEQ VISIT: HCPCS | Performed by: NURSE PRACTITIONER

## 2024-06-12 RX ORDER — DENOSUMAB 60 MG/ML
INJECTION SUBCUTANEOUS
Qty: 1 ML | Refills: 0 | Status: SHIPPED | OUTPATIENT
Start: 2024-07-19

## 2024-06-12 RX ORDER — LORAZEPAM 1 MG/1
TABLET ORAL
Qty: 45 TABLET | Refills: 0 | Status: SHIPPED | OUTPATIENT
Start: 2024-06-12 | End: 2024-07-12

## 2024-06-12 RX ORDER — LISINOPRIL 20 MG/1
20 TABLET ORAL DAILY
Qty: 90 TABLET | Refills: 3 | Status: SHIPPED | OUTPATIENT
Start: 2024-06-12

## 2024-06-12 RX ORDER — CITALOPRAM 40 MG/1
40 TABLET ORAL DAILY
Qty: 90 TABLET | Refills: 2 | Status: SHIPPED | OUTPATIENT
Start: 2024-06-12

## 2024-06-12 ASSESSMENT — PATIENT HEALTH QUESTIONNAIRE - PHQ9
SUM OF ALL RESPONSES TO PHQ QUESTIONS 1-9: 0
SUM OF ALL RESPONSES TO PHQ QUESTIONS 1-9: 0
1. LITTLE INTEREST OR PLEASURE IN DOING THINGS: NOT AT ALL
SUM OF ALL RESPONSES TO PHQ9 QUESTIONS 1 & 2: 0
SUM OF ALL RESPONSES TO PHQ QUESTIONS 1-9: 0
SUM OF ALL RESPONSES TO PHQ QUESTIONS 1-9: 0
2. FEELING DOWN, DEPRESSED OR HOPELESS: NOT AT ALL

## 2024-06-12 ASSESSMENT — ENCOUNTER SYMPTOMS
COUGH: 0
WHEEZING: 0
CHEST TIGHTNESS: 0
ALLERGIC/IMMUNOLOGIC NEGATIVE: 1
SHORTNESS OF BREATH: 0
GASTROINTESTINAL NEGATIVE: 1

## 2024-06-12 NOTE — PROGRESS NOTES
Na 126 on admission  Hypervolemic hypernatremia  Improved to 129 with diuresis  Monitor as outpt       Psychiatric:         Behavior: Behavior normal.         Thought Content: Thought content normal.         Judgment: Judgment normal.             Allergies   Allergen Reactions    Sulfa Antibiotics Hives    Augmentin [Amoxicillin-Pot Clavulanate] Nausea And Vomiting     Prior to Visit Medications    Medication Sig Taking? Authorizing Provider   LORazepam (ATIVAN) 1 MG tablet TAKE 1 AND 1/2 TABLET BY MOUTH ONCE NIGHTLY Yes Manda Duffy DO   oxyCODONE-acetaminophen (PERCOCET) 5-325 MG per tablet Take 1 tablet by mouth 2 times daily for 30 days. Max Daily Amount: 2 tablets Yes Lani Stout APRN - CNP   lisinopril (PRINIVIL;ZESTRIL) 20 MG tablet TAKE 1 TABLET EVERY DAY Yes Bridgette Tapia APRN - CNP   PROLIA 60 MG/ML SOSY SC injection INJECT 1 MILLILITER UNDER THE SKIN ONCE FOR 1 DOSE EVERY 6 MONTHS Yes Bridgette Tapia APRN - CNP   MULTIPLE VITAMINS-MINERALS ER PO Take 1 tablet by mouth daily Yes ProviderGamaliel MD   citalopram (CELEXA) 40 MG tablet TAKE 1 TABLET EVERY DAY Yes Bridgette Tapia APRN - CNP   aspirin-acetaminophen-caffeine (EXCEDRIN MIGRAINE) 250-250-65 MG per tablet Take by mouth daily as needed Yes Provider, MD Gamaliel   Misc. Devices (COMMODE BEDSIDE) MISC 1 Device by Does not apply route as needed (bedside commode as needed) Yes Milton Brooke MD       [facility] (Including outside providers/suppliers regularly involved in providing care):   Patient Care Team:  Tiarra Garcia APRN - CNP as PCP - General (Certified Nurse Practitioner)  Tiarra Garcia APRN - CNP as PCP - Empaneled Provider     Reviewed and updated this visit:  Tobacco  Allergies  Meds  Med Hx  Surg Hx  Soc Hx  Fam Hx

## 2024-06-12 NOTE — PATIENT INSTRUCTIONS

## 2024-07-08 ENCOUNTER — TELEPHONE (OUTPATIENT)
Dept: FAMILY MEDICINE CLINIC | Age: 79
End: 2024-07-08

## 2024-07-08 DIAGNOSIS — F41.9 ANXIETY: ICD-10-CM

## 2024-07-08 RX ORDER — LORAZEPAM 1 MG/1
TABLET ORAL
Qty: 45 TABLET | Refills: 0 | Status: SHIPPED | OUTPATIENT
Start: 2024-07-10 | End: 2024-08-07

## 2024-08-09 ENCOUNTER — TELEPHONE (OUTPATIENT)
Dept: FAMILY MEDICINE CLINIC | Age: 79
End: 2024-08-09

## 2024-08-09 DIAGNOSIS — F41.9 ANXIETY: ICD-10-CM

## 2024-08-09 RX ORDER — LORAZEPAM 1 MG/1
TABLET ORAL
Qty: 45 TABLET | Refills: 0 | Status: SHIPPED | OUTPATIENT
Start: 2024-08-09 | End: 2024-09-06

## 2024-08-09 NOTE — TELEPHONE ENCOUNTER
IS Z PACK ELIGIBLE FOR A REFILL??    6/12/2024    Med contract 11/2023    Drug screen 4/11/24    Future Appointments   Date Time Provider Department Center   8/22/2024 11:00 AM Tairra Garcia, APRN - CNP [facility] DE[facility]   9/11/2024 11:50 AM Srikanth Fregoso MD [facility]   9/13/2024  2:40 PM Tiarra Garcia APRN - CNP [facility]

## 2024-08-09 NOTE — TELEPHONE ENCOUNTER
Patient needs a refill  Patient is out and is requesting a covering provider send today if possible     LORazepam (ATIVAN) 1 MG tablet     30 day  [facility]

## 2024-08-09 NOTE — TELEPHONE ENCOUNTER
Last ov 06/12/2024   Future Appointments   Date Time Provider Department Center   8/22/2024 11:00 AM Tiarra Garcia APRN - CNP [facility] DEP   9/11/2024 11:50 AM Srikanth Fregoso MD [facility]   9/13/2024  2:40 PM Tiarra Garcia APRN - CNP [facility]

## 2024-08-12 RX ORDER — LORAZEPAM 1 MG/1
TABLET ORAL
Qty: 45 TABLET | Refills: 0 | OUTPATIENT
Start: 2024-08-12 | End: 2024-09-12

## 2024-08-12 RX ORDER — AZITHROMYCIN 250 MG/1
TABLET, FILM COATED ORAL
Qty: 6 TABLET | Refills: 0 | OUTPATIENT
Start: 2024-08-12

## 2024-08-12 NOTE — TELEPHONE ENCOUNTER
Pls call the patient.  Why is she needing a refill on the azythromycin ( antibiotic ? )  The other was called in last week

## 2024-08-23 ENCOUNTER — TELEPHONE (OUTPATIENT)
Dept: FAMILY MEDICINE CLINIC | Age: 79
End: 2024-08-23

## 2024-08-23 NOTE — TELEPHONE ENCOUNTER
Attempted to call patient regarding missed appointment today with Tiarra Garcia. TCB to reschedule if needed (informed of her upcoming appt 09/13). No show letter sent.

## 2024-09-04 ENCOUNTER — OFFICE VISIT (OUTPATIENT)
Dept: FAMILY MEDICINE CLINIC | Age: 79
End: 2024-09-04
Payer: MEDICARE

## 2024-09-04 VITALS
TEMPERATURE: 97.6 F | HEART RATE: 99 BPM | OXYGEN SATURATION: 98 % | WEIGHT: 115 LBS | DIASTOLIC BLOOD PRESSURE: 84 MMHG | BODY MASS INDEX: 23.31 KG/M2 | SYSTOLIC BLOOD PRESSURE: 120 MMHG | RESPIRATION RATE: 16 BRPM

## 2024-09-04 DIAGNOSIS — M81.0 AGE-RELATED OSTEOPOROSIS WITHOUT CURRENT PATHOLOGICAL FRACTURE: ICD-10-CM

## 2024-09-04 DIAGNOSIS — I10 ESSENTIAL HYPERTENSION: Chronic | ICD-10-CM

## 2024-09-04 DIAGNOSIS — F41.9 ANXIETY: Primary | ICD-10-CM

## 2024-09-04 PROCEDURE — 1090F PRES/ABSN URINE INCON ASSESS: CPT | Performed by: NURSE PRACTITIONER

## 2024-09-04 PROCEDURE — G8400 PT W/DXA NO RESULTS DOC: HCPCS | Performed by: NURSE PRACTITIONER

## 2024-09-04 PROCEDURE — 99214 OFFICE O/P EST MOD 30 MIN: CPT | Performed by: NURSE PRACTITIONER

## 2024-09-04 PROCEDURE — 1123F ACP DISCUSS/DSCN MKR DOCD: CPT | Performed by: NURSE PRACTITIONER

## 2024-09-04 PROCEDURE — 1036F TOBACCO NON-USER: CPT | Performed by: NURSE PRACTITIONER

## 2024-09-04 PROCEDURE — G8427 DOCREV CUR MEDS BY ELIG CLIN: HCPCS | Performed by: NURSE PRACTITIONER

## 2024-09-04 PROCEDURE — 3079F DIAST BP 80-89 MM HG: CPT | Performed by: NURSE PRACTITIONER

## 2024-09-04 PROCEDURE — 3074F SYST BP LT 130 MM HG: CPT | Performed by: NURSE PRACTITIONER

## 2024-09-04 PROCEDURE — G8420 CALC BMI NORM PARAMETERS: HCPCS | Performed by: NURSE PRACTITIONER

## 2024-09-04 RX ORDER — DENOSUMAB 60 MG/ML
INJECTION SUBCUTANEOUS
Qty: 1 ML | Refills: 0 | Status: SHIPPED | OUTPATIENT
Start: 2024-09-04

## 2024-09-04 RX ORDER — AZITHROMYCIN 250 MG/1
TABLET, FILM COATED ORAL
Qty: 6 TABLET | Refills: 0 | OUTPATIENT
Start: 2024-09-04

## 2024-09-04 RX ORDER — LORAZEPAM 1 MG/1
TABLET ORAL
Qty: 45 TABLET | Refills: 2 | Status: SHIPPED | OUTPATIENT
Start: 2024-09-04 | End: 2024-10-02

## 2024-09-04 ASSESSMENT — ANXIETY QUESTIONNAIRES
5. BEING SO RESTLESS THAT IT IS HARD TO SIT STILL: NOT AT ALL
GAD7 TOTAL SCORE: 0
7. FEELING AFRAID AS IF SOMETHING AWFUL MIGHT HAPPEN: NOT AT ALL
1. FEELING NERVOUS, ANXIOUS, OR ON EDGE: NOT AT ALL
3. WORRYING TOO MUCH ABOUT DIFFERENT THINGS: NOT AT ALL
IF YOU CHECKED OFF ANY PROBLEMS ON THIS QUESTIONNAIRE, HOW DIFFICULT HAVE THESE PROBLEMS MADE IT FOR YOU TO DO YOUR WORK, TAKE CARE OF THINGS AT HOME, OR GET ALONG WITH OTHER PEOPLE: NOT DIFFICULT AT ALL
2. NOT BEING ABLE TO STOP OR CONTROL WORRYING: NOT AT ALL
4. TROUBLE RELAXING: NOT AT ALL
6. BECOMING EASILY ANNOYED OR IRRITABLE: NOT AT ALL

## 2024-09-04 ASSESSMENT — PATIENT HEALTH QUESTIONNAIRE - PHQ9
SUM OF ALL RESPONSES TO PHQ QUESTIONS 1-9: 0
2. FEELING DOWN, DEPRESSED OR HOPELESS: NOT AT ALL
SUM OF ALL RESPONSES TO PHQ9 QUESTIONS 1 & 2: 0
1. LITTLE INTEREST OR PLEASURE IN DOING THINGS: NOT AT ALL
SUM OF ALL RESPONSES TO PHQ QUESTIONS 1-9: 0

## 2024-09-04 ASSESSMENT — ENCOUNTER SYMPTOMS
GASTROINTESTINAL NEGATIVE: 1
RESPIRATORY NEGATIVE: 1

## 2024-09-04 NOTE — TELEPHONE ENCOUNTER
Future Appointments   Date Time Provider Department Center   9/11/2024 11:50 AM Srikanth Fregoso MD [facility]   12/6/2024  2:40 PM Tiarra Garcia, APRN - CNP [facility] DEP     LOV 9/4/2024

## 2024-09-04 NOTE — TELEPHONE ENCOUNTER
Future Appointments   Date Time Provider Department Center   9/11/2024 11:50 AM Srikanth Fregoso MD [facility]   12/6/2024  2:40 PM Tiarra Garcia, APRN - CNP [facility] DE[facility]     LOV 9/4/2024

## 2024-09-04 NOTE — PROGRESS NOTES
9/4/2024    This is a 79 y.o. female   Chief Complaint   Patient presents with    Anxiety    Hypertension   .    Nguyen today for follow-up.  She notes good sleep with that lorazepam nightly.  She is under quite a bit of stress taking care of her sister and getting other people to doctors appointments etc.  However she feels like the citalopram is helpful.  She notes no medication side effects.  Blood pressure is well-controlled at home on lisinopril.         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)    TIA (transient ischemic attack)    Hypotension    Lumbar degenerative disc disease    Chronic pain of both knees    Anxiety state    Calculus of kidney    Carotid stenosis    Chronic pain disorder    Essential hypertension    Gastroesophageal reflux disease without esophagitis    Hypercholesterolemia    Osteoporosis    Restless legs syndrome (RLS)    S/P knee replacement    Subacromial impingement    Osteoarthritis of glenohumeral joint       Current Outpatient Medications   Medication Sig Dispense Refill    LORazepam (ATIVAN) 1 MG tablet TAKE 1 AND 1/2 TABLET BY MOUTH ONCE NIGHTLY 45 tablet 0    oxyCODONE-acetaminophen (PERCOCET) 5-325 MG per tablet Take 1 tablet by mouth 2 times daily for 30 days. Max Daily Amount: 2 tablets 60 tablet 0    citalopram (CELEXA) 40 MG tablet Take 1 tablet by mouth daily 90 tablet 2    lisinopril (PRINIVIL;ZESTRIL) 20 MG tablet Take 1 tablet by mouth daily 90 tablet 3    denosumab (PROLIA) 60 MG/ML SOSY SC injection INJECT 1 MILLILITER UNDER THE SKIN ONCE FOR 1 DOSE EVERY 6 MONTHS 1 mL 0    MULTIPLE VITAMINS-MINERALS ER PO Take 1 tablet by mouth daily      aspirin-acetaminophen-caffeine (EXCEDRIN MIGRAINE) 250-250-65 MG per tablet Take by mouth daily as needed      Misc. Devices (COMMODE BEDSIDE) MISC 1 Device by Does not apply route as needed (bedside commode as needed) 1 each 0     No current facility-administered

## 2024-09-20 ENCOUNTER — TELEPHONE (OUTPATIENT)
Dept: FAMILY MEDICINE CLINIC | Age: 79
End: 2024-09-20

## 2024-09-20 DIAGNOSIS — F41.9 ANXIETY: ICD-10-CM

## 2024-09-20 RX ORDER — CITALOPRAM HYDROBROMIDE 40 MG/1
40 TABLET ORAL DAILY
Qty: 30 TABLET | Refills: 0 | Status: SHIPPED | OUTPATIENT
Start: 2024-09-20

## 2024-10-09 ENCOUNTER — OFFICE VISIT (OUTPATIENT)
Dept: FAMILY MEDICINE CLINIC | Age: 79
End: 2024-10-09

## 2024-10-09 ENCOUNTER — TELEPHONE (OUTPATIENT)
Dept: FAMILY MEDICINE CLINIC | Age: 79
End: 2024-10-09

## 2024-10-09 VITALS
HEART RATE: 99 BPM | OXYGEN SATURATION: 99 % | TEMPERATURE: 98.3 F | DIASTOLIC BLOOD PRESSURE: 78 MMHG | SYSTOLIC BLOOD PRESSURE: 114 MMHG | RESPIRATION RATE: 16 BRPM

## 2024-10-09 DIAGNOSIS — J22 ACUTE LOWER RESPIRATORY INFECTION: Primary | ICD-10-CM

## 2024-10-09 DIAGNOSIS — R51.9 NONINTRACTABLE HEADACHE, UNSPECIFIED CHRONICITY PATTERN, UNSPECIFIED HEADACHE TYPE: ICD-10-CM

## 2024-10-09 LAB
INFLUENZA A ANTIGEN, POC: NEGATIVE
INFLUENZA B ANTIGEN, POC: NEGATIVE
Lab: 0
PERFORMING INSTRUMENT: NORMAL
QC PASS/FAIL: 1
SARS-COV-2, POC: NORMAL

## 2024-10-09 RX ORDER — BENZONATATE 100 MG/1
100 CAPSULE ORAL EVERY 6 HOURS PRN
Qty: 30 CAPSULE | Refills: 0 | Status: SHIPPED | OUTPATIENT
Start: 2024-10-09 | End: 2024-10-16

## 2024-10-09 RX ORDER — AZITHROMYCIN 250 MG/1
TABLET, FILM COATED ORAL
Qty: 6 TABLET | Refills: 0 | Status: SHIPPED | OUTPATIENT
Start: 2024-10-09 | End: 2024-10-19

## 2024-10-09 RX ORDER — DEXTROMETHORPHAN HYDROBROMIDE AND PROMETHAZINE HYDROCHLORIDE 15; 6.25 MG/5ML; MG/5ML
5 SYRUP ORAL 4 TIMES DAILY PRN
Qty: 180 ML | Refills: 0 | Status: SHIPPED | OUTPATIENT
Start: 2024-10-09

## 2024-10-09 ASSESSMENT — ENCOUNTER SYMPTOMS
WHEEZING: 0
SORE THROAT: 0
ABDOMINAL PAIN: 0
VOICE CHANGE: 0
CHEST TIGHTNESS: 1
SINUS PRESSURE: 0
SHORTNESS OF BREATH: 0
TROUBLE SWALLOWING: 0
SINUS PAIN: 0
RHINORRHEA: 1
VOMITING: 0
COUGH: 1
NAUSEA: 0
EYE REDNESS: 0

## 2024-10-09 NOTE — PROGRESS NOTES
Cardiovascular:      Rate and Rhythm: Normal rate and regular rhythm.      Pulses: Normal pulses.      Heart sounds: Normal heart sounds.   Pulmonary:      Effort: Pulmonary effort is normal.      Breath sounds: Normal breath sounds. No wheezing or rhonchi.      Comments: Freq cough heard during visit  Abdominal:      General: Abdomen is flat. Bowel sounds are normal.      Palpations: Abdomen is soft.      Tenderness: There is no abdominal tenderness.   Musculoskeletal:         General: Normal range of motion.      Cervical back: Normal range of motion and neck supple.   Lymphadenopathy:      Cervical: No cervical adenopathy.   Skin:     General: Skin is warm and dry.      Capillary Refill: Capillary refill takes less than 2 seconds.   Neurological:      General: No focal deficit present.      Mental Status: She is alert and oriented to person, place, and time.   Psychiatric:         Mood and Affect: Mood normal.         Behavior: Behavior normal.         XIMENA Swain - CNP

## 2024-10-15 DIAGNOSIS — F41.9 ANXIETY: ICD-10-CM

## 2024-10-15 NOTE — TELEPHONE ENCOUNTER
Future Appointments   Date Time Provider Department Center   11/5/2024  3:30 PM Lani Stout APRN - CNP [facility]   12/6/2024  2:40 PM Tiarra Garcia APRN - CNP [facility] DE[facility]     9/4/2024

## 2024-10-15 NOTE — TELEPHONE ENCOUNTER
Patient called  Out of medication  Citalopram 40 mg   [facility]    Last ov 10/9/2024  Future Appointments   Date Time Provider Department Center   11/5/2024  3:30 PM Lani Stout APRN - CNP [facility]   12/6/2024  2:40 PM Tiarra Garcia APRN - CNP [facility]

## 2024-10-16 RX ORDER — LORAZEPAM 1 MG/1
TABLET ORAL
Qty: 45 TABLET | Refills: 0 | OUTPATIENT
Start: 2024-10-16 | End: 2024-11-15

## 2024-10-16 RX ORDER — CITALOPRAM HYDROBROMIDE 40 MG/1
40 TABLET ORAL DAILY
Qty: 30 TABLET | Refills: 5 | Status: SHIPPED | OUTPATIENT
Start: 2024-10-16

## 2024-10-30 DIAGNOSIS — J22 ACUTE LOWER RESPIRATORY INFECTION: ICD-10-CM

## 2024-10-30 RX ORDER — BENZONATATE 100 MG/1
100 CAPSULE ORAL EVERY 6 HOURS PRN
Qty: 30 CAPSULE | Refills: 0 | Status: SHIPPED | OUTPATIENT
Start: 2024-10-30 | End: 2024-11-06

## 2024-10-30 NOTE — TELEPHONE ENCOUNTER
LAST REFILL 10/09/2024  AMOUNT 30     0 REFILLS  LAST VISIT 10/9/2024  NEXT VISIT   Future Appointments   Date Time Provider Department Center   11/5/2024  3:30 PM Lani Stout APRN - CNP [facility]   12/6/2024  2:40 PM Tiarra Garcia APRN - CNP [facility]

## 2024-11-29 DIAGNOSIS — F41.9 ANXIETY: ICD-10-CM

## 2024-12-02 RX ORDER — LORAZEPAM 1 MG/1
TABLET ORAL
Qty: 45 TABLET | Refills: 0 | Status: SHIPPED | OUTPATIENT
Start: 2024-12-02 | End: 2024-12-06 | Stop reason: SDUPTHER

## 2024-12-06 ENCOUNTER — OFFICE VISIT (OUTPATIENT)
Dept: FAMILY MEDICINE CLINIC | Age: 79
End: 2024-12-06

## 2024-12-06 VITALS
OXYGEN SATURATION: 98 % | RESPIRATION RATE: 16 BRPM | TEMPERATURE: 97.8 F | WEIGHT: 115 LBS | BODY MASS INDEX: 23.31 KG/M2 | SYSTOLIC BLOOD PRESSURE: 116 MMHG | HEART RATE: 96 BPM | DIASTOLIC BLOOD PRESSURE: 78 MMHG

## 2024-12-06 DIAGNOSIS — F41.9 ANXIETY: ICD-10-CM

## 2024-12-06 DIAGNOSIS — F41.1 ANXIETY STATE: Primary | Chronic | ICD-10-CM

## 2024-12-06 DIAGNOSIS — S81.812A SKIN TEAR OF LEFT LOWER LEG WITHOUT COMPLICATION, INITIAL ENCOUNTER: ICD-10-CM

## 2024-12-06 DIAGNOSIS — I10 ESSENTIAL HYPERTENSION: Chronic | ICD-10-CM

## 2024-12-06 PROBLEM — G45.9 TIA (TRANSIENT ISCHEMIC ATTACK): Status: RESOLVED | Noted: 2018-06-13 | Resolved: 2024-12-06

## 2024-12-06 PROBLEM — I95.9 HYPOTENSION: Status: RESOLVED | Noted: 2020-10-01 | Resolved: 2024-12-06

## 2024-12-06 PROBLEM — T84.032A MECHANICAL LOOSENING OF INTERNAL RIGHT KNEE PROSTHETIC JOINT (HCC): Status: RESOLVED | Noted: 2017-10-25 | Resolved: 2024-12-06

## 2024-12-06 RX ORDER — CITALOPRAM HYDROBROMIDE 40 MG/1
40 TABLET ORAL DAILY
Qty: 90 TABLET | Refills: 1 | Status: SHIPPED | OUTPATIENT
Start: 2024-12-06

## 2024-12-06 RX ORDER — LORAZEPAM 1 MG/1
TABLET ORAL
Qty: 45 TABLET | Refills: 2 | Status: SHIPPED | OUTPATIENT
Start: 2024-12-31 | End: 2025-01-05

## 2024-12-06 SDOH — ECONOMIC STABILITY: FOOD INSECURITY: WITHIN THE PAST 12 MONTHS, YOU WORRIED THAT YOUR FOOD WOULD RUN OUT BEFORE YOU GOT MONEY TO BUY MORE.: NEVER TRUE

## 2024-12-06 SDOH — ECONOMIC STABILITY: FOOD INSECURITY: WITHIN THE PAST 12 MONTHS, THE FOOD YOU BOUGHT JUST DIDN'T LAST AND YOU DIDN'T HAVE MONEY TO GET MORE.: NEVER TRUE

## 2024-12-06 SDOH — ECONOMIC STABILITY: INCOME INSECURITY: HOW HARD IS IT FOR YOU TO PAY FOR THE VERY BASICS LIKE FOOD, HOUSING, MEDICAL CARE, AND HEATING?: NOT HARD AT ALL

## 2024-12-06 ASSESSMENT — PATIENT HEALTH QUESTIONNAIRE - PHQ9
SUM OF ALL RESPONSES TO PHQ QUESTIONS 1-9: 0
SUM OF ALL RESPONSES TO PHQ QUESTIONS 1-9: 0
SUM OF ALL RESPONSES TO PHQ9 QUESTIONS 1 & 2: 0
SUM OF ALL RESPONSES TO PHQ QUESTIONS 1-9: 0
1. LITTLE INTEREST OR PLEASURE IN DOING THINGS: NOT AT ALL
2. FEELING DOWN, DEPRESSED OR HOPELESS: NOT AT ALL
SUM OF ALL RESPONSES TO PHQ QUESTIONS 1-9: 0

## 2024-12-06 ASSESSMENT — ENCOUNTER SYMPTOMS
RESPIRATORY NEGATIVE: 1
GASTROINTESTINAL NEGATIVE: 1
BACK PAIN: 0
COLOR CHANGE: 0

## 2024-12-06 NOTE — PROGRESS NOTES
aspirin-acetaminophen-caffeine (EXCEDRIN MIGRAINE) 250-250-65 MG per tablet Take by mouth daily as needed      Misc. Devices (COMMODE BEDSIDE) MISC 1 Device by Does not apply route as needed (bedside commode as needed) 1 each 0     No current facility-administered medications for this visit.       Allergies   Allergen Reactions    Sulfa Antibiotics Hives    Augmentin [Amoxicillin-Pot Clavulanate] Nausea And Vomiting       There were no vitals taken for this visit.    Social History     Tobacco Use    Smoking status: Former     Current packs/day: 0.00     Average packs/day: 0.5 packs/day for 10.0 years (5.0 ttl pk-yrs)     Types: Cigarettes     Start date: 1967     Quit date: 1977     Years since quittin.4    Smokeless tobacco: Never   Substance Use Topics    Alcohol use: Not Currently       Review of Systems   Constitutional:  Negative for unexpected weight change.   Respiratory: Negative.     Cardiovascular: Negative.    Gastrointestinal: Negative.    Musculoskeletal:  Negative for back pain, gait problem and myalgias.   Skin:  Positive for wound. Negative for color change.   Psychiatric/Behavioral:  Negative for behavioral problems, dysphoric mood, hallucinations and sleep disturbance. The patient is not nervous/anxious.        Physical Exam  Vitals and nursing note reviewed.   Constitutional:       General: She is not in acute distress.     Appearance: She is well-developed. She is not diaphoretic.   HENT:      Head: Normocephalic and atraumatic.      Right Ear: External ear normal.      Left Ear: External ear normal.      Nose: Nose normal.      Mouth/Throat:      Pharynx: No oropharyngeal exudate.   Eyes:      General:         Right eye: No discharge.         Left eye: No discharge.      Conjunctiva/sclera: Conjunctivae normal.   Cardiovascular:      Rate and Rhythm: Normal rate and regular rhythm.      Heart sounds: Normal heart sounds. No murmur heard.     No friction rub. No gallop.

## 2025-01-08 PROBLEM — G57.91 MONONEUROPATHY OF RIGHT LOWER EXTREMITY: Status: ACTIVE | Noted: 2025-01-08

## 2025-01-08 PROBLEM — M47.812 CERVICAL SPONDYLOSIS WITHOUT MYELOPATHY: Status: ACTIVE | Noted: 2025-01-08

## 2025-03-11 DIAGNOSIS — M81.0 AGE-RELATED OSTEOPOROSIS WITHOUT CURRENT PATHOLOGICAL FRACTURE: ICD-10-CM

## 2025-03-11 NOTE — TELEPHONE ENCOUNTER
Future Appointments   Date Time Provider Department Center   3/14/2025  1:00 PM Tiarra Garcia APRN - CNP [facility]   5/6/2025 11:40 AM Lani Stout APRN - CNP [facility]     LOV 12/6/2024

## 2025-03-12 RX ORDER — DENOSUMAB 60 MG/ML
INJECTION SUBCUTANEOUS
Qty: 1 ML | Refills: 0 | Status: SHIPPED | OUTPATIENT
Start: 2025-03-12

## 2025-03-21 ENCOUNTER — OFFICE VISIT (OUTPATIENT)
Dept: FAMILY MEDICINE CLINIC | Age: 80
End: 2025-03-21

## 2025-03-21 VITALS
RESPIRATION RATE: 16 BRPM | TEMPERATURE: 98.2 F | SYSTOLIC BLOOD PRESSURE: 133 MMHG | BODY MASS INDEX: 24.35 KG/M2 | DIASTOLIC BLOOD PRESSURE: 88 MMHG | OXYGEN SATURATION: 96 % | HEART RATE: 91 BPM | HEIGHT: 58 IN | WEIGHT: 116 LBS

## 2025-03-21 DIAGNOSIS — F41.9 ANXIETY: ICD-10-CM

## 2025-03-21 DIAGNOSIS — F51.01 PRIMARY INSOMNIA: ICD-10-CM

## 2025-03-21 DIAGNOSIS — I10 ESSENTIAL HYPERTENSION: Chronic | ICD-10-CM

## 2025-03-21 DIAGNOSIS — Z00.00 MEDICARE ANNUAL WELLNESS VISIT, SUBSEQUENT: Primary | ICD-10-CM

## 2025-03-21 DIAGNOSIS — B96.89 ACUTE BACTERIAL SINUSITIS: ICD-10-CM

## 2025-03-21 DIAGNOSIS — J01.90 ACUTE BACTERIAL SINUSITIS: ICD-10-CM

## 2025-03-21 RX ORDER — CITALOPRAM HYDROBROMIDE 40 MG/1
40 TABLET ORAL DAILY
Qty: 90 TABLET | Refills: 1 | Status: SHIPPED | OUTPATIENT
Start: 2025-03-21

## 2025-03-21 RX ORDER — METHYLPREDNISOLONE 4 MG/1
TABLET ORAL
Qty: 1 KIT | Refills: 0 | Status: SHIPPED | OUTPATIENT
Start: 2025-03-21

## 2025-03-21 RX ORDER — LISINOPRIL 20 MG/1
20 TABLET ORAL DAILY
Qty: 90 TABLET | Refills: 3 | Status: SHIPPED | OUTPATIENT
Start: 2025-03-21

## 2025-03-21 RX ORDER — LORAZEPAM 1 MG/1
TABLET ORAL
Qty: 45 TABLET | Refills: 2 | Status: SHIPPED | OUTPATIENT
Start: 2025-04-01 | End: 2025-07-01

## 2025-03-21 SDOH — ECONOMIC STABILITY: FOOD INSECURITY: WITHIN THE PAST 12 MONTHS, YOU WORRIED THAT YOUR FOOD WOULD RUN OUT BEFORE YOU GOT MONEY TO BUY MORE.: NEVER TRUE

## 2025-03-21 SDOH — ECONOMIC STABILITY: FOOD INSECURITY: WITHIN THE PAST 12 MONTHS, THE FOOD YOU BOUGHT JUST DIDN'T LAST AND YOU DIDN'T HAVE MONEY TO GET MORE.: NEVER TRUE

## 2025-03-21 ASSESSMENT — PATIENT HEALTH QUESTIONNAIRE - PHQ9
2. FEELING DOWN, DEPRESSED OR HOPELESS: NOT AT ALL
SUM OF ALL RESPONSES TO PHQ QUESTIONS 1-9: 0
1. LITTLE INTEREST OR PLEASURE IN DOING THINGS: NOT AT ALL
SUM OF ALL RESPONSES TO PHQ QUESTIONS 1-9: 0

## 2025-03-21 NOTE — PROGRESS NOTES
Medicare Annual Wellness Visit    Nguyen Morales is here for Medicare AWV, Anxiety, and Sinusitis (Negative covid/flu test went to urgent care started last Friday night )    Assessment & Plan  1. Sinusitis: Acute.  - Significant inflammation and swelling in the sinuses noted, likely impeding drainage.  - Extend current antibiotic regimen by 3 days, totaling a 10-day course.  - Initiate a 6-day course of steroids to manage the sinus infection.  - Continue Mucinex and Augmentin regimen.  - Resume daily allergy medication and Flonase after completing the steroid course.  - Prescription for citalopram refills to be sent to [facility].  - Contact the office for a potential switch in antibiotics if no improvement by Monday.    2. Mood Disorder: Stable.  - Continue citalopram as it is helping manage mood.  - No noticeable side effects reported.  - Prescription for citalopram refills to be sent to [facility].    3. Back Pain.  - Strengthen upper back by bringing shoulder blades down and back.    Follow-up  - Follow up in 6 months.  Medicare annual wellness visit, subsequent  Essential hypertension  -     lisinopril (PRINIVIL;ZESTRIL) 20 MG tablet; Take 1 tablet by mouth daily, Disp-90 tablet, R-3Normal  -     ESTABLISHED, SF MDM, 10-19 MIN [[id number]]  Anxiety  -     citalopram (CELEXA) 40 MG tablet; Take 1 tablet by mouth daily, Disp-90 tablet, R-1Refilled in coverage for Tiarra CamarenaierNormal  -     ESTABLISHED, SF MDM, 10-19 MIN [[id number]]  -     LORazepam (ATIVAN) 1 MG tablet; TAKE 1 AND 1/2 TABLET BY MOUTH ONCE NIGHTLY, Disp-45 tablet, R-2Normal  Acute bacterial sinusitis  -     ESTABLISHED, SF MDM, 10-19 MIN [[id number]]  Primary insomnia  -     LORazepam (ATIVAN) 1 MG tablet; TAKE 1 AND 1/2 TABLET BY MOUTH ONCE NIGHTLY, Disp-45 tablet, R-2Normal    Results  Laboratory Studies  COVID-19 test was negative.     No follow-ups on file.     Subjective     History of Present Illness  The patient presents for evaluation of sinus infection,

## 2025-03-24 ENCOUNTER — TELEPHONE (OUTPATIENT)
Dept: FAMILY MEDICINE CLINIC | Age: 80
End: 2025-03-24

## 2025-03-24 NOTE — TELEPHONE ENCOUNTER
Rt Side of face is still swollen and  her ear is still bothering her and she is still not feeling well.  is almost out of steroids  She was wondering if she needed another round of steroids or to come back in? She is said when she blows her nose it is coming out around her rt eye as well.

## 2025-03-24 NOTE — TELEPHONE ENCOUNTER
Patient wanted to let Tiarra Mancerajulio cesar know how she was doing after visit to urgent care. She had a sinus infection- Wants a follow up call [phone]

## 2025-03-26 ENCOUNTER — OFFICE VISIT (OUTPATIENT)
Dept: FAMILY MEDICINE CLINIC | Age: 80
End: 2025-03-26

## 2025-03-26 VITALS
SYSTOLIC BLOOD PRESSURE: 102 MMHG | RESPIRATION RATE: 16 BRPM | DIASTOLIC BLOOD PRESSURE: 62 MMHG | WEIGHT: 116 LBS | OXYGEN SATURATION: 98 % | BODY MASS INDEX: 24.24 KG/M2 | HEART RATE: 77 BPM | TEMPERATURE: 98.2 F

## 2025-03-26 DIAGNOSIS — J32.9 RECURRENT SINUSITIS: Primary | ICD-10-CM

## 2025-03-26 RX ORDER — DOXYCYCLINE HYCLATE 100 MG
100 TABLET ORAL 2 TIMES DAILY
Qty: 14 TABLET | Refills: 0 | Status: SHIPPED | OUTPATIENT
Start: 2025-03-26 | End: 2025-04-02

## 2025-03-26 RX ORDER — METHYLPREDNISOLONE ACETATE 40 MG/ML
40 INJECTION, SUSPENSION INTRA-ARTICULAR; INTRALESIONAL; INTRAMUSCULAR; SOFT TISSUE ONCE
Status: COMPLETED | OUTPATIENT
Start: 2025-03-26 | End: 2025-03-26

## 2025-03-26 RX ADMIN — METHYLPREDNISOLONE ACETATE 40 MG: 40 INJECTION, SUSPENSION INTRA-ARTICULAR; INTRALESIONAL; INTRAMUSCULAR; SOFT TISSUE at 13:58

## 2025-03-26 ASSESSMENT — ENCOUNTER SYMPTOMS
SINUS PRESSURE: 1
GASTROINTESTINAL NEGATIVE: 1
COUGH: 1
EYE DISCHARGE: 1
SINUS PAIN: 1

## 2025-03-26 NOTE — PROGRESS NOTES
and frontal sinus tenderness present.      Left Sinus: Frontal sinus tenderness present.      Mouth/Throat:      Pharynx: No oropharyngeal exudate.   Eyes:      General:         Right eye: No discharge.         Left eye: No discharge.      Conjunctiva/sclera: Conjunctivae normal.   Cardiovascular:      Rate and Rhythm: Normal rate and regular rhythm.      Heart sounds: Normal heart sounds. No murmur heard.     No friction rub. No gallop.   Pulmonary:      Effort: Pulmonary effort is normal. No respiratory distress.      Breath sounds: Normal breath sounds. No wheezing or rales.   Abdominal:      General: Bowel sounds are normal. There is no distension.      Palpations: Abdomen is soft.      Tenderness: There is no abdominal tenderness.   Musculoskeletal:         General: No tenderness. Normal range of motion.      Cervical back: Normal range of motion and neck supple.   Lymphadenopathy:      Cervical: No cervical adenopathy.   Skin:     General: Skin is warm and dry.      Coloration: Skin is not pale.      Findings: No erythema or rash.   Neurological:      Mental Status: She is alert and oriented to person, place, and time.      Motor: No abnormal muscle tone.      Coordination: Coordination normal.   Psychiatric:         Behavior: Behavior normal.         Thought Content: Thought content normal.         Judgment: Judgment normal.         Physical Exam      Results      Diagnosis      ICD-10-CM    1. Recurrent sinusitis  J32.9 doxycycline hyclate (VIBRA-TABS) 100 MG tablet     methylPREDNISolone acetate (DEPO-MEDROL) injection 40 mg        Assessment & Plan    Sinusitis worsening, failed two antibiotics and oral steroids  Start doxy  Depo medrol in office  Discussed referral to ENT- states previously had to have sinus surgery for similar symptoms as teen  Push fluids  Restart flonase  Normal saline nasal spray  Report if fails to improve    No orders of the defined types were placed in this encounter.      Orders

## 2025-04-03 ENCOUNTER — TELEPHONE (OUTPATIENT)
Dept: FAMILY MEDICINE CLINIC | Age: 80
End: 2025-04-03

## 2025-04-04 NOTE — TELEPHONE ENCOUNTER
Submitted PA for Prolia  Via CMM Key: [id number]   STATUS: Available without authorization.     If this requires a response please respond to the pool ( P [facility] MEDICATION PRE-AUTH).      Thank you please advise patient.

## 2025-04-25 ENCOUNTER — OFFICE VISIT (OUTPATIENT)
Dept: FAMILY MEDICINE CLINIC | Age: 80
End: 2025-04-25
Payer: MEDICARE

## 2025-04-25 VITALS
OXYGEN SATURATION: 97 % | TEMPERATURE: 97.9 F | WEIGHT: 117 LBS | SYSTOLIC BLOOD PRESSURE: 116 MMHG | DIASTOLIC BLOOD PRESSURE: 72 MMHG | HEART RATE: 100 BPM | BODY MASS INDEX: 24.45 KG/M2 | RESPIRATION RATE: 16 BRPM

## 2025-04-25 DIAGNOSIS — M79.605 PAIN IN BOTH LOWER EXTREMITIES: Primary | ICD-10-CM

## 2025-04-25 DIAGNOSIS — M79.604 PAIN IN BOTH LOWER EXTREMITIES: Primary | ICD-10-CM

## 2025-04-25 DIAGNOSIS — J32.9 CHRONIC SINUSITIS, UNSPECIFIED LOCATION: ICD-10-CM

## 2025-04-25 DIAGNOSIS — R05.1 ACUTE COUGH: ICD-10-CM

## 2025-04-25 PROCEDURE — G8427 DOCREV CUR MEDS BY ELIG CLIN: HCPCS | Performed by: NURSE PRACTITIONER

## 2025-04-25 PROCEDURE — 3074F SYST BP LT 130 MM HG: CPT | Performed by: NURSE PRACTITIONER

## 2025-04-25 PROCEDURE — 1090F PRES/ABSN URINE INCON ASSESS: CPT | Performed by: NURSE PRACTITIONER

## 2025-04-25 PROCEDURE — 1123F ACP DISCUSS/DSCN MKR DOCD: CPT | Performed by: NURSE PRACTITIONER

## 2025-04-25 PROCEDURE — 99214 OFFICE O/P EST MOD 30 MIN: CPT | Performed by: NURSE PRACTITIONER

## 2025-04-25 PROCEDURE — 1036F TOBACCO NON-USER: CPT | Performed by: NURSE PRACTITIONER

## 2025-04-25 PROCEDURE — G8420 CALC BMI NORM PARAMETERS: HCPCS | Performed by: NURSE PRACTITIONER

## 2025-04-25 PROCEDURE — 1159F MED LIST DOCD IN RCRD: CPT | Performed by: NURSE PRACTITIONER

## 2025-04-25 PROCEDURE — G8400 PT W/DXA NO RESULTS DOC: HCPCS | Performed by: NURSE PRACTITIONER

## 2025-04-25 PROCEDURE — 3078F DIAST BP <80 MM HG: CPT | Performed by: NURSE PRACTITIONER

## 2025-04-25 RX ORDER — MONTELUKAST SODIUM 10 MG/1
10 TABLET ORAL DAILY
Qty: 30 TABLET | Refills: 3 | Status: SHIPPED | OUTPATIENT
Start: 2025-04-25

## 2025-04-25 RX ORDER — DEXTROMETHORPHAN HYDROBROMIDE AND PROMETHAZINE HYDROCHLORIDE 15; 6.25 MG/5ML; MG/5ML
5 SYRUP ORAL 4 TIMES DAILY PRN
Qty: 240 ML | Refills: 0 | Status: SHIPPED | OUTPATIENT
Start: 2025-04-25

## 2025-04-25 ASSESSMENT — PATIENT HEALTH QUESTIONNAIRE - PHQ9
SUM OF ALL RESPONSES TO PHQ QUESTIONS 1-9: 0
SUM OF ALL RESPONSES TO PHQ QUESTIONS 1-9: 0
1. LITTLE INTEREST OR PLEASURE IN DOING THINGS: NOT AT ALL
SUM OF ALL RESPONSES TO PHQ QUESTIONS 1-9: 0
2. FEELING DOWN, DEPRESSED OR HOPELESS: NOT AT ALL
SUM OF ALL RESPONSES TO PHQ QUESTIONS 1-9: 0

## 2025-04-25 ASSESSMENT — ENCOUNTER SYMPTOMS
STRIDOR: 0
WHEEZING: 0
BACK PAIN: 1
CHEST TIGHTNESS: 0
SHORTNESS OF BREATH: 0
RHINORRHEA: 0
COUGH: 1

## 2025-04-25 NOTE — PATIENT INSTRUCTIONS
Increase flonase to twice daily  Continue claritin  OK to take mucinex with the medications  Start montelukast for inflammation and cough

## 2025-04-25 NOTE — PROGRESS NOTES
4/25/2025    This is a 80 y.o. female   Chief Complaint   Patient presents with    Cough     In chest coughed so much she threw up last night, very weak,    .    HPI    History of Present Illness  The patient presents for evaluation of sinusitis, ear fullness, and leg pain.    She was last seen approximately a month ago for sinus-related issues, which have since resolved. However, she now reports bilateral ear discomfort and pain during coughing episodes, a symptom not previously associated with her sinus condition. She also experienced a feverish sensation the previous night. Her symptoms worsened yesterday, and she has been experiencing chest discomfort since this morning. A severe coughing episode last night induced vomiting. No wheezing is reported. Flonase has been used nightly until Mucinex was started yesterday. She also takes a generic version of Zyrtec     Persistent ear fullness is noted, with one ear remaining closed for several years, even during flights. Despite attempts at recommended exercises, she has been unable to alleviate the ear fullness.    Pain in the knees, legs, and femur bone has been affecting mobility. 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 Stairs must be navigated at work. A past diagnosis of arthritis is noted, along with the development of spider veins and varicose veins.     Patient Active Problem List   Diagnosis    Lumbar degenerative disc disease    Chronic pain of both knees    Anxiety state    Calculus of kidney    Carotid stenosis    Chronic pain disorder    Essential hypertension    Hypercholesterolemia    Osteoporosis    S/P knee replacement    Subacromial impingement    Osteoarthritis of glenohumeral joint    Mononeuropathy of right lower extremity    Cervical spondylosis without myelopathy       Current Outpatient Medications   Medication Sig Dispense Refill    lisinopril (PRINIVIL;ZESTRIL) 20 MG tablet Take 1 tablet

## 2025-05-06 PROBLEM — M51.34 THORACIC DEGENERATIVE DISC DISEASE: Status: ACTIVE | Noted: 2025-05-06

## 2025-06-28 DIAGNOSIS — F41.9 ANXIETY: ICD-10-CM

## 2025-06-28 DIAGNOSIS — F51.01 PRIMARY INSOMNIA: ICD-10-CM

## 2025-06-30 RX ORDER — LORAZEPAM 1 MG/1
TABLET ORAL
Qty: 45 TABLET | Refills: 2 | Status: SHIPPED | OUTPATIENT
Start: 2025-06-30 | End: 2025-09-30

## 2025-06-30 NOTE — TELEPHONE ENCOUNTER
LOV 4/25/2025    Future Appointments   Date Time Provider Department Center   8/5/2025  1:30 PM Lani Stout APRN - CNP [facility]   9/22/2025  2:40 PM Tiarra Garcia APRN - CNP [facility]

## 2025-07-30 DIAGNOSIS — F41.9 ANXIETY: ICD-10-CM

## 2025-07-30 RX ORDER — CITALOPRAM HYDROBROMIDE 40 MG/1
40 TABLET ORAL DAILY
Qty: 90 TABLET | Refills: 1 | Status: SHIPPED | OUTPATIENT
Start: 2025-07-30

## 2025-07-30 NOTE — TELEPHONE ENCOUNTER
Patient called  Refill   30 day  Citalopram 40 mg  Martha Lunsford    4/25/2025 last ov  Future Appointments   Date Time Provider Department Center   8/5/2025  1:30 PM Lani Stout APRN - [facility]   9/22/2025  2:40 PM Tiarra Garcia APRN - CNP [place] [facility]

## 2025-07-30 NOTE — TELEPHONE ENCOUNTER
Pt stated she does not have any left took the last one yesterday. It was sent in on 3/21/25 90 day supply with 1 refill.   Pharmacy said she picked up 90 day supply on 6/18/25 pt said she did not get a 90 day supply.

## 2025-07-30 NOTE — TELEPHONE ENCOUNTER
Tiarra Garcia, APRN - Yancy Brunson, MA1 hour ago (3:31 PM)       She needs to contact the pharmacy. I will send over a new script but I am not sure why she did not get a 90 day supply     LM with info

## (undated) DEVICE — ZIMMER® STERILE DISPOSABLE TOURNIQUET CUFF WITH PLC, DUAL PORT, SINGLE BLADDER, 30 IN. (76 CM)

## (undated) DEVICE — SYRINGE MED 30ML STD CLR PLAS LUERLOCK TIP N CTRL DISP

## (undated) DEVICE — SUTURE MCRYL + SZ 4-0 L18IN ABSRB UD L19MM PS-2 3/8 CIR MCP496G

## (undated) DEVICE — COVER,TABLE,HEAVY DUTY,77"X90",STRL: Brand: MEDLINE

## (undated) DEVICE — STERILE SYNTHETIC POLYISOPRENE POWDER-FREE SURGICAL GLOVES WITH HYDROGEL COATING, SMOOTH FINISH, STRAIGHT FINGER: Brand: PROTEXIS

## (undated) DEVICE — ORTHO PRE OP PACK: Brand: MEDLINE INDUSTRIES, INC.

## (undated) DEVICE — SOLUTION IV IRRIG WATER 1000ML POUR BRL 2F7114

## (undated) DEVICE — BOWL AND CEMENT CARTRIDGE WITH BREAKAWAY FEMORAL NOZZLE AND MEDIUM PRESSURIZER: Brand: ACM

## (undated) DEVICE — SUTURE VCRL SZ 1 L18IN ABSRB UD L36MM CT-1 1/2 CIR J841D

## (undated) DEVICE — 3M™ STERI-DRAPE™ U-DRAPE 1015: Brand: STERI-DRAPE™

## (undated) DEVICE — COVER LT HNDL BLU PLAS

## (undated) DEVICE — KNEE HOLDER DISPOSABLE LINER: Brand: ALVARADO®  KNEE SUPPORT

## (undated) DEVICE — PACK PROCEDURE SURG TOTAL KNEE

## (undated) DEVICE — PLATE ES AD W 9FT CRD 2

## (undated) DEVICE — JEWISH HOSPITAL TURNOVER KIT: Brand: MEDLINE INDUSTRIES, INC.

## (undated) DEVICE — APPLICATOR PREP 26ML 0.7% IOD POVACRYLEX 74% ISO ALC ST

## (undated) DEVICE — E-Z CLEAN, NON-STICK, PTFE COATED, ELECTROSURGICAL BLADE ELECTRODE, 2.5 INCH (6.35 CM): Brand: EZ CLEAN

## (undated) DEVICE — SOLUTION IV IRRIG 0.9% NACL 3000ML BAG 2B7477

## (undated) DEVICE — SYRINGE IRRIG 60ML SFT PLIABLE BLB EZ TO GRP 1 HND USE W/

## (undated) DEVICE — BLADE SAW RECIP HVY DUTY LNG 27796327] STRYKER CORP]

## (undated) DEVICE — HIGH FLOW TIP

## (undated) DEVICE — SURE SET-DOUBLE BASIN-LF: Brand: MEDLINE INDUSTRIES, INC.

## (undated) DEVICE — 2108 SERIES SAGITTAL BLADE FAN, OFFSET  (29.0 X 0.89 X 73.0MM)

## (undated) DEVICE — SPONGE,LAP,18"X18",DLX,XR,ST,5/PK,40/PK: Brand: MEDLINE

## (undated) DEVICE — 2108 SERIES SAGITTAL BLADE (9.1 X 0.64 X 35.2MM)

## (undated) DEVICE — BLANKET WRM W29.9XL79.1IN UP BODY FORC AIR MISTRAL-AIR

## (undated) DEVICE — SUTURE STRATAFIX SPRL SZ 1 L14IN ABSRB VLT L48CM CTX 1/2 SXPD2B405

## (undated) DEVICE — CONTAINER,SPECIMEN,PNEU TUBE,3OZ,OR STRL: Brand: MEDLINE

## (undated) DEVICE — 3M™ COBAN™ NL STERILE NON-LATEX SELF-ADHERENT WRAP, 2086S, 6 IN X 5 YD (15 CM X 4,5 M), 12 ROLLS/CASE: Brand: 3M™ COBAN™

## (undated) DEVICE — COVER XR CASS W21XL40IN UNIV ADH MICROSHIELD

## (undated) DEVICE — 3M™ STERI-DRAPE™ INSTRUMENT POUCH 1018: Brand: STERI-DRAPE™

## (undated) DEVICE — SUTURE VCRL SZ 0 L18IN ABSRB UD L36MM CT-1 1/2 CIR J840D

## (undated) DEVICE — PADDING CAST N ADH 12X6 IN CRIMPED FINISH 100% COTTON WBRLII

## (undated) DEVICE — SUTURE VCRL SZ 2-0 L18IN ABSRB UD CT-1 L36MM 1/2 CIR J839D

## (undated) DEVICE — STANDARD HYPODERMIC NEEDLE,POLYPROPYLENE HUB: Brand: MONOJECT

## (undated) DEVICE — STERILE POLYISOPRENE POWDER-FREE SURGICAL GLOVES WITH EMOLLIENT COATING: Brand: PROTEXIS

## (undated) DEVICE — DUAL CUT SAGITTAL BLADE

## (undated) DEVICE — UNDERGLOVE SURG SZ 8.5 FNGR THK0.21MIL GRN LTX BEAD CUF

## (undated) DEVICE — PEEL-AWAY HOOD: Brand: FLYTE, SURGICOOL

## (undated) DEVICE — BIPOLAR SEALER 23-112-1 AQM 6.0: Brand: AQUAMANTYS ®

## (undated) DEVICE — GARMENT,MEDLINE,DVT,INT,CALF,MED, GEN2: Brand: MEDLINE

## (undated) DEVICE — HANDPIECE SUCTION TUBING INTERPULSE 10FT

## (undated) DEVICE — SOLUTION IV 1000ML 0.9% SOD CHL

## (undated) DEVICE — STERILE PVP: Brand: MEDLINE INDUSTRIES, INC.

## (undated) DEVICE — SST TWIST DRILL, STANDARD, 3.2MM DIA. X 127MM: Brand: MICROAIRE®

## (undated) DEVICE — NEEDLE SPNL L3.5IN PNK HUB S STL REG WALL FIT STYL W/ QNCKE

## (undated) DEVICE — GLOVE 6 LTX ST BIOGEL M PF BEAD CUFF

## (undated) DEVICE — SST BUR, WIRE PASS DRILL, 2 FLUTES, MED., 2MM DIA.: Brand: MICROAIRE®

## (undated) DEVICE — MARKER SURG SKIN UTIL BLK REG TIP NONSMEARING W/ 6IN RUL

## (undated) DEVICE — YANKAUER,OPEN TIP,W/O VENT,STERILE: Brand: MEDLINE INDUSTRIES, INC.